# Patient Record
Sex: MALE | Race: WHITE
[De-identification: names, ages, dates, MRNs, and addresses within clinical notes are randomized per-mention and may not be internally consistent; named-entity substitution may affect disease eponyms.]

---

## 2017-07-15 ENCOUNTER — HOSPITAL ENCOUNTER (INPATIENT)
Dept: HOSPITAL 10 - FTE | Age: 67
LOS: 3 days | Discharge: HOME | DRG: 247 | End: 2017-07-18
Payer: MEDICAID

## 2017-07-15 VITALS
HEIGHT: 67 IN | HEIGHT: 67 IN | BODY MASS INDEX: 30.45 KG/M2 | WEIGHT: 194.01 LBS | BODY MASS INDEX: 30.45 KG/M2 | WEIGHT: 194.01 LBS

## 2017-07-15 DIAGNOSIS — Z79.02: ICD-10-CM

## 2017-07-15 DIAGNOSIS — I25.5: ICD-10-CM

## 2017-07-15 DIAGNOSIS — I71.2: ICD-10-CM

## 2017-07-15 DIAGNOSIS — I10: ICD-10-CM

## 2017-07-15 DIAGNOSIS — I21.4: Primary | ICD-10-CM

## 2017-07-15 DIAGNOSIS — Z79.82: ICD-10-CM

## 2017-07-15 DIAGNOSIS — N17.9: ICD-10-CM

## 2017-07-15 DIAGNOSIS — R07.9: ICD-10-CM

## 2017-07-15 DIAGNOSIS — I25.10: ICD-10-CM

## 2017-07-15 DIAGNOSIS — Z79.4: ICD-10-CM

## 2017-07-15 DIAGNOSIS — E11.9: ICD-10-CM

## 2017-07-15 LAB
ADD SCAN DIFF: NO
ALBUMIN SERPL-MCNC: 4.9 G/DL (ref 3.3–4.9)
ALBUMIN/GLOB SERPL: 1.75 {RATIO}
ALP SERPL-CCNC: 70 IU/L (ref 42–121)
ALT SERPL-CCNC: 45 IU/L (ref 13–69)
ANION GAP SERPL CALC-SCNC: 19 MMOL/L (ref 8–16)
APTT BLD: 26.6 SEC (ref 25–35)
AST SERPL-CCNC: 36 IU/L (ref 15–46)
BASOPHILS # BLD AUTO: 0 10^3/UL (ref 0–0.1)
BASOPHILS NFR BLD: 0.5 % (ref 0–2)
BILIRUB DIRECT SERPL-MCNC: 0 MG/DL (ref 0–0.2)
BILIRUB SERPL-MCNC: 0.1 MG/DL (ref 0.2–1.3)
BNP SERPL-MCNC: 1640 PG/ML (ref 0–125)
BUN SERPL-MCNC: 28 MG/DL (ref 7–20)
CALCIUM SERPL-MCNC: 9.7 MG/DL (ref 8.4–10.2)
CHLORIDE SERPL-SCNC: 100 MMOL/L (ref 97–110)
CK MB SERPL-MCNC: 6.18 NG/ML (ref 0–2.4)
CK SERPL-CCNC: 129 IU/L (ref 23–200)
CO2 SERPL-SCNC: 30 MMOL/L (ref 21–31)
CREAT SERPL-MCNC: 1.17 MG/DL (ref 0.61–1.24)
EOSINOPHIL # BLD: 0.3 10^3/UL (ref 0–0.5)
EOSINOPHIL NFR BLD: 4 % (ref 0–7)
ERYTHROCYTE [DISTWIDTH] IN BLOOD BY AUTOMATED COUNT: 11.9 % (ref 11.5–14.5)
GLOBULIN SER-MCNC: 2.8 G/DL (ref 1.3–3.2)
GLUCOSE SERPL-MCNC: 131 MG/DL (ref 70–220)
HCT VFR BLD CALC: 43.9 % (ref 42–52)
HGB BLD-MCNC: 14.6 G/DL (ref 14–18)
INR PPP: 0.89
LYMPHOCYTES # BLD AUTO: 2.7 10^3/UL (ref 0.8–2.9)
LYMPHOCYTES NFR BLD AUTO: 34.3 % (ref 15–51)
MCH RBC QN AUTO: 30.3 PG (ref 29–33)
MCHC RBC AUTO-ENTMCNC: 33.3 G/DL (ref 32–37)
MCV RBC AUTO: 91.1 FL (ref 82–101)
MONOCYTES # BLD: 0.5 10^3/UL (ref 0.3–0.9)
MONOCYTES NFR BLD: 6.8 % (ref 0–11)
NEUTROPHILS # BLD: 4.3 10^3/UL (ref 1.6–7.5)
NEUTROPHILS NFR BLD AUTO: 54 % (ref 39–77)
NRBC # BLD MANUAL: 0 10^3/UL (ref 0–0)
NRBC BLD QL: 0 /100WBC (ref 0–0)
PLATELET # BLD: 186 10^3/UL (ref 140–415)
PMV BLD AUTO: 12.3 FL (ref 7.4–10.4)
POTASSIUM SERPL-SCNC: 4.4 MMOL/L (ref 3.5–5.1)
PROT SERPL-MCNC: 7.7 G/DL (ref 6.1–8.1)
PROTHROMBIN TIME: 12 SEC (ref 12.2–14.2)
PT RATIO: 0.9
RBC # BLD AUTO: 4.82 10^6/UL (ref 4.7–6.1)
SODIUM SERPL-SCNC: 145 MMOL/L (ref 135–144)
TROPONIN I SERPL-MCNC: 1.16 NG/ML (ref 0–0.12)
WBC # BLD AUTO: 8 10^3/UL (ref 4.8–10.8)

## 2017-07-15 PROCEDURE — 85730 THROMBOPLASTIN TIME PARTIAL: CPT

## 2017-07-15 PROCEDURE — 92928 PRQ TCAT PLMT NTRAC ST 1 LES: CPT

## 2017-07-15 PROCEDURE — 83880 ASSAY OF NATRIURETIC PEPTIDE: CPT

## 2017-07-15 PROCEDURE — C1887 CATHETER, GUIDING: HCPCS

## 2017-07-15 PROCEDURE — 84484 ASSAY OF TROPONIN QUANT: CPT

## 2017-07-15 PROCEDURE — 80053 COMPREHEN METABOLIC PANEL: CPT

## 2017-07-15 PROCEDURE — 84100 ASSAY OF PHOSPHORUS: CPT

## 2017-07-15 PROCEDURE — 84443 ASSAY THYROID STIM HORMONE: CPT

## 2017-07-15 PROCEDURE — 83036 HEMOGLOBIN GLYCOSYLATED A1C: CPT

## 2017-07-15 PROCEDURE — 82962 GLUCOSE BLOOD TEST: CPT

## 2017-07-15 PROCEDURE — 80048 BASIC METABOLIC PNL TOTAL CA: CPT

## 2017-07-15 PROCEDURE — C1725 CATH, TRANSLUMIN NON-LASER: HCPCS

## 2017-07-15 PROCEDURE — 82553 CREATINE MB FRACTION: CPT

## 2017-07-15 PROCEDURE — 71275 CT ANGIOGRAPHY CHEST: CPT

## 2017-07-15 PROCEDURE — 83735 ASSAY OF MAGNESIUM: CPT

## 2017-07-15 PROCEDURE — 71010: CPT

## 2017-07-15 PROCEDURE — 85025 COMPLETE CBC W/AUTO DIFF WBC: CPT

## 2017-07-15 PROCEDURE — 85610 PROTHROMBIN TIME: CPT

## 2017-07-15 PROCEDURE — C9113 INJ PANTOPRAZOLE SODIUM, VIA: HCPCS

## 2017-07-15 PROCEDURE — 82550 ASSAY OF CK (CPK): CPT

## 2017-07-15 PROCEDURE — 93458 L HRT ARTERY/VENTRICLE ANGIO: CPT

## 2017-07-15 PROCEDURE — 80061 LIPID PANEL: CPT

## 2017-07-15 PROCEDURE — 93306 TTE W/DOPPLER COMPLETE: CPT

## 2017-07-15 PROCEDURE — C1876 STENT, NON-COA/NON-COV W/DEL: HCPCS

## 2017-07-15 PROCEDURE — 93005 ELECTROCARDIOGRAM TRACING: CPT

## 2017-07-15 PROCEDURE — C1769 GUIDE WIRE: HCPCS

## 2017-07-15 NOTE — ERA
ER Documentation


Chief Complaint


Date/Time


DATE: 7/15/17 


TIME: 17:28


Chief Complaint


LEFT ARM PAIN RADIATES TO NECK AND CHEST WALL X1WEEK





HPI


This is a very pleasant 67-year-old male, [place]er-speaking with a history of 

diabetes and hypertension that presents to the emergency department complaining 

of 2 weeks of intermittent chest pain.  The patient indicates that the pain 

initially began in his left arm was a pressure-like sensation.  The pain began 

to spread to his neck and chest wall.  He states the pain is localized to the 

left chest with no radiation to the right side.  He also indicates that he has 

been experiencing back pain on the left-hand side.  He denies any fever shaking 

or chills.  He has no shortness of breath at rest or exertion.  He denies any 

recent remote trauma.  He states he has never had any similar symptoms in the 

past.  He denies any recent exertion or heavy lifting.  He is right-handed 

dominant.  He did indicate that just prior to arrival he attempted to lift a 

heavy object and this significantly exacerbated the pain to 10 out of 10 in 

intensity.  He denies any weakness of the upper or lower extremities.  He 

denies any lower back pain or abdominal pain.  He has not experienced any 

hemoptysis hematemesis or melanotic stools





ROS


All systems reviewed and are negative except as per history of present illness.





Allergies


Allergies:  


Coded Allergies:  


     No Known Allergy (Unverified , 7/15/17)





PMhx/Soc


History of Surgery:  No


Anesthesia Reaction:  No


Hx Neurological Disorder:  No


Hx Respiratory Disorders:  No


Hx Cardiac Disorders:  Yes (HTN)


Hx Psychiatric Problems:  No


Hx Miscellaneous Medical Probl:  Yes (diabetes)


Hx Alcohol Use:  Yes


Hx Substance Use:  No


Hx Tobacco Use:  No


Smoking Status:  Never smoker





Physical Exam


Vitals





Vital Signs








  Date Time  Temp Pulse Resp B/P Pulse Ox O2 Delivery O2 Flow Rate FiO2


 


7/15/17 16:28 97.9 80 18 141/82 98   








Physical Exam


Constitutional:Well-developed. Well-nourished.


HEENT:Normocephalic. Atraumatic.Pupils were equal round reactive to light. 

Moist mucous membranes.No tonsillar exudates.


Neck: No nuchal rigidity. No lymphadenopathy. No posterior cervical spine 

tenderness or step-offs.


Respiratory: Not using accessory muscles of respiration.Lungs were clear to 

auscultation bilaterally. No rhonchi. No rales. No wheezing. 


Cardiovascular: Regular rate regular rhythm.No murmurs. No rubs were 

appreciated.S1, S2 normal. Distal pulses are palpable 2+ bilaterally.


GI: Abdomen was soft. Nontender. Non Distended. No pulsatile abdominal masses 

or bruits. No rebound. No guarding. Bowel sounds were present and normal. 


Muscle skeletal: Full range of motion of both the upper and lower extremities 

bilaterally.Normal muscle tone.No assymetrical calf tenderness or swelling.  

Reproducible tenderness over the left forearm with compartment soft.


Skin: No petechia, no purpura. No lesions on the palms or the soles of the 

feet. No maculopapular rash.


NEURO: Patient was alert, awake, orientated x3.No facial droop. Gait observed 

and normal with no ataxia.Speech had regular rate and rhythm. No focal 

neurological deficits.


Result Diagram:  


7/15/17 1740                                                                   

             7/15/17 1740





Results 24 hrs








 Laboratory Tests








Test


  7/15/17


17:40


 


White Blood Count 8.010^3/ul 


 


Red Blood Count 4.8210^6/ul 


 


Hemoglobin 14.6g/dl 


 


Hematocrit 43.9% 


 


Mean Corpuscular Volume 91.1fl 


 


Mean Corpuscular Hemoglobin 30.3pg 


 


Mean Corpuscular Hemoglobin


Concent 33.3g/dl 


 


 


Red Cell Distribution Width 11.9% 


 


Platelet Count 42012^3/UL 


 


Mean Platelet Volume 12.3fl 


 


Neutrophils % 54.0% 


 


Lymphocytes % 34.3% 


 


Monocytes % 6.8% 


 


Eosinophils % 4.0% 


 


Basophils % 0.5% 


 


Nucleated Red Blood Cells % 0.0/100WBC 


 


Neutrophils # 4.310^3/ul 


 


Lymphocytes # 2.710^3/ul 


 


Monocytes # 0.510^3/ul 


 


Eosinophils # 0.310^3/ul 


 


Basophils # 0.010^3/ul 


 


Nucleated Red Blood Cells # 0.010^3/ul 


 


Prothrombin Time 12.0Sec 


 


Prothrombin Time Ratio 0.9 


 


INR International Normalized


Ratio 0.89 


 


 


Activated Partial


Thromboplast Time 26.6Sec 


 


 


Sodium Level 145mmol/L 


 


Potassium Level 4.4mmol/L 


 


Chloride Level 100mmol/L 


 


Carbon Dioxide Level 30mmol/L 


 


Anion Gap 19 


 


Blood Urea Nitrogen 28mg/dl 


 


Creatinine 1.17mg/dl 


 


Glucose Level 131mg/dl 


 


Calcium Level 9.7mg/dl 


 


Total Bilirubin 0.1mg/dl 


 


Direct Bilirubin 0.00mg/dl 


 


Indirect Bilirubin 0.1mg/dl 


 


Aspartate Amino Transf


(AST/SGOT) 36IU/L 


 


 


Alanine Aminotransferase


(ALT/SGPT) 45IU/L 


 


 


Alkaline Phosphatase 70IU/L 


 


Creatine Kinase 129IU/L 


 


Creatine Kinase Index 4.8 


 


Creatinine Kinase MB (Mass) 6.18ng/ml 


 


Troponin I 1.160ng/ml 


 


B-Type Natriuretic Peptide 1640PG/ML 


 


Total Protein 7.7g/dl 


 


Albumin 4.9g/dl 


 


Globulin 2.80g/dl 


 


Albumin/Globulin Ratio 1.75 








 Current Medications








 Medications


  (Trade)  Dose


 Ordered  Sig/Lyle


 Route


 PRN Reason  Start Time


 Stop Time Status Last Admin


Dose Admin


 


 Aspirin


  (Aspirin)  325 mg  ONCE  STAT


 PO


   7/15/17 18:34


 7/15/17 18:35 DC 7/15/17 18:57


 


 


 Nitroglycerin


  (Nitroglycerin


  (Sl Tab) 0.4 Mg)  1 tab  Q5M UP TO 3 DOSES PRN


 SL


 CHEST PAIN  7/15/17 19:00


     


 


 


 IV Flush 10 ml  10 ml  STK-MED ONCE


 .ROUTE


   7/15/17 19:06


 7/15/17 19:07 DC  


 


 


 Sodium Chloride


  (NS)  0 ml @ ud  STK-MED ONCE


 .ROUTE


   7/15/17 19:06


 7/15/17 19:07 DC  


 


 


 Iodixanol


  (Visipaque Locm)  100 ml  STK-MED ONCE


 .ROUTE


   7/15/17 19:06


 7/15/17 19:07 DC  


 


 


 Ondansetron HCl


  (Zofran Inj)  4 mg  ER BRIDGE PRN


 IV


 NAUSEA AND/OR VOMITING  7/15/17 20:00


 7/16/17 19:59   


 


 


 Acetaminophen


  (Tylenol Tab)  650 mg  ER BRIDGE PRN


 PO


 MILD PAIN/FEVER  7/15/17 20:00


 7/16/17 19:59   


 


 


 IV Flush 10 ml  10 ml  STK-MED ONCE


 .ROUTE


   7/15/17 21:06


 7/15/17 21:07 DC  


 


 


 Sodium Chloride


  (NS)  100 ml @ ud  STK-MED ONCE


 .ROUTE


   7/15/17 21:06


 7/15/17 21:07 DC  


 


 


 Iodixanol


  (Visipaque Locm)  100 ml  STK-MED ONCE


 .ROUTE


   7/15/17 21:06


 7/15/17 21:07 DC  


 


 


 IV Flush 10 ml  10 ml  STK-MED ONCE


 .ROUTE


   7/15/17 21:28


 7/15/17 21:29 DC  


 


 


 Sodium Chloride


  (NS)  100 ml @ ud  STK-MED ONCE


 .ROUTE


   7/15/17 21:28


 7/15/17 21:29 DC  


 


 


 Iodixanol


  (Visipaque Locm)  100 ml  STK-MED ONCE


 .ROUTE


   7/15/17 21:28


 7/15/17 21:29 DC  


 














Procedures/MDM


The patient presented to the emergency department with chest pain. My clinical 

evaluation and workup was to distinguish minor causes of chest pain from acute 

life threatening conditions such as myocardial infarction, pulmonary embolism, 

aortic dissection, esophageal rupture, cardiac tamponade.





The patient was placed on a cardiac monitor and continuous pulse oximetry.  IV 

access established by nursing staff.





12 Lead EKG tracing ordered and reviewed by myself showed: 


Normal sinus rhythm of 75 bpm and no arrhythmia.


KY interval normal.


QRS duration normal.


No ST segment elevation.  Minimal criteria for LVH


No ST segment depression. No changes consistent with acute ischemia. 





The patient's troponin was elevated.  He was treated for non-STEMI.  The 

patient had received aspirin and nitroglycerin with improvement of his chest 

discomfort.  He had no other electrolyte abnormalities.





I repeated the EKG at 1938.  12 Lead EKG tracing ordered and reviewed by myself 

showed: 


Normal sinus rhythm of 73 bpm and no arrhythmia.


KY interval normal.


QRS duration normal.


No ST segment elevation


No ST segment depression. No changes consistent with acute ischemia. 





Given that the patient's chest pain radiates to the back I obtained a CT scan 

with IV contrast.  This was read by the radiologist as well as myself and 

indicated the following: The patient had no evidence of pulmonary embolism or 

aortic dissection.  The patient had an aneurysmal dilatation of the ascending 

aorta measuring 3.9.  The descending aorta was of normal caliber.





The patient will be admitted in serious condition with an anticipated stay of 

greater than 2 midnights under the care of the hospitalist Dr. Santos.  I will 

place a consult to the vascular surgeon Dr. Valdez for further evaluation 

into the patient's chest pain radiating to the back however at this time the 

patient was stable and did not require surgical intervention for the ascending 

aortic aneurysm.





Departure


Diagnosis:  


 Primary Impression:  


 NSTEMI (non-ST elevated myocardial infarction)


 Additional Impression:  


 Mild ascending aorta dilatation


Condition:  Serious











ROSE NELSON Jul 15, 2017 17:30

## 2017-07-15 NOTE — RADRPT
PROCEDURE:   CT Chest with IV contrast. 

 

CLINICAL INDICATION:   Chest pain. 

 

TECHNIQUE:   CT scan of the chest was performed on a multidetector scanner.  The patient was scanned
 following the uncomplicated intravenous administration of 100 cc of Visapaque 320 contrast.  3D, co
liliana and sagittal reformatted images were obtained from the axial source images. Images were review
ed on a high-resolution PACS workstation. The total exam CTDlvol = 355 mGy and DLP = 809 mGy-cm. One
 of the following 3 dose reduction techniques were used: Automated exposure control; adjustment of t
he mA and/or kV according to patient size; or use of iterative reconstruction technique.

 

COMPARISON:   07/15/2017 

 

FINDINGS:

 

Per CT technician report, there was extravasation of the patient's intravenous contrast injection.  
Study is nondiagnostic as there is no significant intravascular contrast bolus within the pulmonary 
arteries or aorta.

 

There is aneurysmal dilatation of the ascending aorta measuring 3.9 cm AP diameter.  Descending aort
a is normal in caliber..  There is no mediastinal or hilar lymphadenopathy or mass.  Heart is normal
 size.  There is trace fluid within the pericardial recesses.

 

There is mild dependent atelectasis.  No focal infiltrate.  There is no pleural effusion.  There is 
no pneumothorax.

 

There are no fractures.  There are degenerative changes of the thoracic spine.

 

Imaging obtained through the upper abdomen reveals no acute abnormality.   

 

IMPRESSION:

1.  Nondiagnostic examination for pulmonary emboli or aortic dissection.  No significant intravascul
ar contrast due to contrast extravasation at the injection site per CT technician.

2.  Aneurysmal dilatation of the ascending aorta.

3.  Trace fluid in the pericardial recess.

4.  Mild dependent atelectasis.  No focal infiltrate.

5.  Degenerative changes of the thoracic spine.

 

 

RPTAT:  HMVK

_____________________________________________ 

.Skinny Arreaga MD, MD           Date    Time 

Electronically viewed and signed by .Skinny Arreaga MD, MD on 07/15/2017 21:41 

 

D:  07/15/2017 21:41  T:  07/15/2017 21:41

.K/

## 2017-07-15 NOTE — RADRPT
PROCEDURE:   XR Chest. 

 

CLINICAL INDICATION:   Chest pain.

 

TECHNIQUE:   Single frontal view. 

 

COMPARISON:   None. 

 

FINDINGS:

The lungs are clear. 

The heart size is normal. 

There is no pleural effusion. 

There is no pneumothorax.   

 

IMPRESSION:

1.  Normal chest radiograph.

 

RPTAT: QQ

_____________________________________________ 

.Matheus Wills MD, MD           Date    Time 

Electronically viewed and signed by .Matheus Wills MD, MD on 07/15/2017 18:24 

 

D:  07/15/2017 18:24  T:  07/15/2017 18:24

.R/

## 2017-07-16 VITALS — DIASTOLIC BLOOD PRESSURE: 71 MMHG | RESPIRATION RATE: 20 BRPM | SYSTOLIC BLOOD PRESSURE: 107 MMHG

## 2017-07-16 VITALS — RESPIRATION RATE: 20 BRPM | DIASTOLIC BLOOD PRESSURE: 72 MMHG | SYSTOLIC BLOOD PRESSURE: 133 MMHG | HEART RATE: 65 BPM

## 2017-07-16 VITALS — DIASTOLIC BLOOD PRESSURE: 55 MMHG | RESPIRATION RATE: 18 BRPM | SYSTOLIC BLOOD PRESSURE: 122 MMHG

## 2017-07-16 VITALS — HEART RATE: 68 BPM

## 2017-07-16 VITALS — SYSTOLIC BLOOD PRESSURE: 133 MMHG | DIASTOLIC BLOOD PRESSURE: 73 MMHG | RESPIRATION RATE: 19 BRPM

## 2017-07-16 VITALS — HEART RATE: 58 BPM

## 2017-07-16 VITALS — HEART RATE: 60 BPM

## 2017-07-16 VITALS — SYSTOLIC BLOOD PRESSURE: 107 MMHG | RESPIRATION RATE: 18 BRPM | DIASTOLIC BLOOD PRESSURE: 66 MMHG

## 2017-07-16 VITALS — SYSTOLIC BLOOD PRESSURE: 111 MMHG | DIASTOLIC BLOOD PRESSURE: 69 MMHG | RESPIRATION RATE: 18 BRPM

## 2017-07-16 VITALS — HEART RATE: 59 BPM

## 2017-07-16 VITALS — HEART RATE: 71 BPM

## 2017-07-16 VITALS — HEART RATE: 64 BPM

## 2017-07-16 LAB
ADD SCAN DIFF: NO
ADD SCAN DIFF: NO
ALBUMIN SERPL-MCNC: 4.1 G/DL (ref 3.3–4.9)
ALBUMIN SERPL-MCNC: 4.3 G/DL (ref 3.3–4.9)
ALBUMIN/GLOB SERPL: 1.79 {RATIO}
ALBUMIN/GLOB SERPL: 1.86 {RATIO}
ALP SERPL-CCNC: 54 IU/L (ref 42–121)
ALP SERPL-CCNC: 57 IU/L (ref 42–121)
ALT SERPL-CCNC: 41 IU/L (ref 13–69)
ALT SERPL-CCNC: 45 IU/L (ref 13–69)
ANION GAP SERPL CALC-SCNC: 18 MMOL/L (ref 8–16)
ANION GAP SERPL CALC-SCNC: 19 MMOL/L (ref 8–16)
APTT BLD: 27.8 SEC (ref 25–35)
AST SERPL-CCNC: 38 IU/L (ref 15–46)
AST SERPL-CCNC: 39 IU/L (ref 15–46)
BASOPHILS # BLD AUTO: 0 10^3/UL (ref 0–0.1)
BASOPHILS # BLD AUTO: 0.1 10^3/UL (ref 0–0.1)
BASOPHILS NFR BLD: 0.6 % (ref 0–2)
BASOPHILS NFR BLD: 0.8 % (ref 0–2)
BILIRUB DIRECT SERPL-MCNC: 0 MG/DL (ref 0–0.2)
BILIRUB DIRECT SERPL-MCNC: 0 MG/DL (ref 0–0.2)
BILIRUB SERPL-MCNC: 0.3 MG/DL (ref 0.2–1.3)
BILIRUB SERPL-MCNC: 0.3 MG/DL (ref 0.2–1.3)
BUN SERPL-MCNC: 24 MG/DL (ref 7–20)
BUN SERPL-MCNC: 25 MG/DL (ref 7–20)
CALCIUM SERPL-MCNC: 9.2 MG/DL (ref 8.4–10.2)
CALCIUM SERPL-MCNC: 9.7 MG/DL (ref 8.4–10.2)
CHLORIDE SERPL-SCNC: 97 MMOL/L (ref 97–110)
CHLORIDE SERPL-SCNC: 98 MMOL/L (ref 97–110)
CHOLEST SERPL-MCNC: 146 MG/DL (ref 100–200)
CHOLEST/HDLC SERPL: 4.1 RATIO
CK MB SERPL-MCNC: 10.7 NG/ML (ref 0–2.4)
CK MB SERPL-MCNC: 8.73 NG/ML (ref 0–2.4)
CK MB SERPL-MCNC: 8.92 NG/ML (ref 0–2.4)
CK SERPL-CCNC: 151 IU/L (ref 23–200)
CK SERPL-CCNC: 152 IU/L (ref 23–200)
CK SERPL-CCNC: 162 IU/L (ref 23–200)
CO2 SERPL-SCNC: 29 MMOL/L (ref 21–31)
CO2 SERPL-SCNC: 31 MMOL/L (ref 21–31)
CREAT SERPL-MCNC: 0.91 MG/DL (ref 0.61–1.24)
CREAT SERPL-MCNC: 1.05 MG/DL (ref 0.61–1.24)
EOSINOPHIL # BLD: 0.2 10^3/UL (ref 0–0.5)
EOSINOPHIL # BLD: 0.2 10^3/UL (ref 0–0.5)
EOSINOPHIL NFR BLD: 2.7 % (ref 0–7)
EOSINOPHIL NFR BLD: 3.4 % (ref 0–7)
ERYTHROCYTE [DISTWIDTH] IN BLOOD BY AUTOMATED COUNT: 11.8 % (ref 11.5–14.5)
ERYTHROCYTE [DISTWIDTH] IN BLOOD BY AUTOMATED COUNT: 11.9 % (ref 11.5–14.5)
GLOBULIN SER-MCNC: 2.2 G/DL (ref 1.3–3.2)
GLOBULIN SER-MCNC: 2.4 G/DL (ref 1.3–3.2)
GLUCOSE SERPL-MCNC: 172 MG/DL (ref 70–220)
GLUCOSE SERPL-MCNC: 240 MG/DL (ref 70–220)
HCT VFR BLD CALC: 40.9 % (ref 42–52)
HCT VFR BLD CALC: 40.9 % (ref 42–52)
HDLC SERPL-MCNC: 35 MG/DL (ref 30–78)
HGB BLD-MCNC: 13.6 G/DL (ref 14–18)
HGB BLD-MCNC: 13.8 G/DL (ref 14–18)
INR PPP: 1.06
LYMPHOCYTES # BLD AUTO: 1.8 10^3/UL (ref 0.8–2.9)
LYMPHOCYTES # BLD AUTO: 2 10^3/UL (ref 0.8–2.9)
LYMPHOCYTES NFR BLD AUTO: 28.7 % (ref 15–51)
LYMPHOCYTES NFR BLD AUTO: 30.5 % (ref 15–51)
MCH RBC QN AUTO: 30.6 PG (ref 29–33)
MCH RBC QN AUTO: 31.1 PG (ref 29–33)
MCHC RBC AUTO-ENTMCNC: 33.3 G/DL (ref 32–37)
MCHC RBC AUTO-ENTMCNC: 33.7 G/DL (ref 32–37)
MCV RBC AUTO: 92.1 FL (ref 82–101)
MCV RBC AUTO: 92.1 FL (ref 82–101)
MONOCYTES # BLD: 0.4 10^3/UL (ref 0.3–0.9)
MONOCYTES # BLD: 0.4 10^3/UL (ref 0.3–0.9)
MONOCYTES NFR BLD: 6.1 % (ref 0–11)
MONOCYTES NFR BLD: 6.8 % (ref 0–11)
NEUTROPHILS # BLD: 3.8 10^3/UL (ref 1.6–7.5)
NEUTROPHILS # BLD: 3.9 10^3/UL (ref 1.6–7.5)
NEUTROPHILS NFR BLD AUTO: 58.9 % (ref 39–77)
NEUTROPHILS NFR BLD AUTO: 60.5 % (ref 39–77)
NRBC # BLD MANUAL: 0 10^3/UL (ref 0–0)
NRBC # BLD MANUAL: 0 10^3/UL (ref 0–0)
NRBC BLD QL: 0 /100WBC (ref 0–0)
NRBC BLD QL: 0 /100WBC (ref 0–0)
PLATELET # BLD: 170 10^3/UL (ref 140–415)
PLATELET # BLD: 172 10^3/UL (ref 140–415)
PMV BLD AUTO: 12.4 FL (ref 7.4–10.4)
PMV BLD AUTO: 12.4 FL (ref 7.4–10.4)
POTASSIUM SERPL-SCNC: 4.1 MMOL/L (ref 3.5–5.1)
POTASSIUM SERPL-SCNC: 4.2 MMOL/L (ref 3.5–5.1)
PROT SERPL-MCNC: 6.3 G/DL (ref 6.1–8.1)
PROT SERPL-MCNC: 6.7 G/DL (ref 6.1–8.1)
PROTHROMBIN TIME: 13.8 SEC (ref 12.2–14.2)
PT RATIO: 1.1
RBC # BLD AUTO: 4.44 10^6/UL (ref 4.7–6.1)
RBC # BLD AUTO: 4.44 10^6/UL (ref 4.7–6.1)
SODIUM SERPL-SCNC: 141 MMOL/L (ref 135–144)
SODIUM SERPL-SCNC: 143 MMOL/L (ref 135–144)
TRIGL SERPL-MCNC: 124 MG/DL (ref 0–149)
TROPONIN I SERPL-MCNC: 1.99 NG/ML (ref 0–0.12)
TROPONIN I SERPL-MCNC: 2.04 NG/ML (ref 0–0.12)
TROPONIN I SERPL-MCNC: 2.07 NG/ML (ref 0–0.12)
WBC # BLD AUTO: 6.3 10^3/UL (ref 4.8–10.8)
WBC # BLD AUTO: 6.6 10^3/UL (ref 4.8–10.8)

## 2017-07-16 RX ADMIN — HEPARIN SODIUM AND DEXTROSE SCH MLS/HR: 10000; 5 INJECTION INTRAVENOUS at 11:56

## 2017-07-16 RX ADMIN — ASPIRIN 81 MG SCH MG: 81 TABLET ORAL at 08:23

## 2017-07-16 RX ADMIN — METOPROLOL TARTRATE SCH MG: 25 TABLET, FILM COATED ORAL at 20:51

## 2017-07-16 RX ADMIN — METOPROLOL TARTRATE SCH MG: 25 TABLET, FILM COATED ORAL at 10:40

## 2017-07-16 RX ADMIN — PANTOPRAZOLE SODIUM SCH MG: 40 INJECTION, POWDER, FOR SOLUTION INTRAVENOUS at 07:24

## 2017-07-16 RX ADMIN — HEPARIN SODIUM AND DEXTROSE SCH MLS/HR: 10000; 5 INJECTION INTRAVENOUS at 19:12

## 2017-07-16 RX ADMIN — INSULIN GLARGINE SCH UNIT: 100 INJECTION, SOLUTION SUBCUTANEOUS at 12:01

## 2017-07-16 RX ADMIN — ATORVASTATIN CALCIUM SCH MG: 40 TABLET, FILM COATED ORAL at 20:52

## 2017-07-16 NOTE — RADRPT
PROCEDURE:   CTA Chest. 

 

CLINICAL INDICATION:   Chest pain 

 

TECHNIQUE:  Cough the injury and was attempted however of the line that had been placed was not sero
ma for injection and the study was unable to be performed.

 

One or more of the following dose reduction techniques were used:

- Automated exposure control.

- Adjustment of the mA and/or kV according to patient size.

Use of iterative reconstruction technique.

 

.6 mGycm

CTDIvol June 11 0.3 mGy

 

COMPARISON:   No prior studies are available for comparison. 

 

FINDINGS:

Nondiagnostic study

 

 

IMPRESSION:

Nondiagnostic study

 

RPTAT: QQ

_____________________________________________ 

.Patricia Delvalle MD, MD           Date    Time 

Electronically viewed and signed by .Patricia Delvalle MD, MD on 07/16/2017 09:17 

 

D:  07/16/2017 09:17  T:  07/16/2017 09:17

.IGNACIA/

## 2017-07-16 NOTE — RADRPT
PROCEDURE:   CTA Chest. 

 

CLINICAL INDICATION:   Chest pain 

 

TECHNIQUE:   The study was performed utilizing a multidetector CT scanner. Direct spiral 1 mm axial 
sections were obtained from the thoracic inlet to the upper abdomen with the use of 100 cc of Omnipa
que 350 nonionic intravenous contrast material and reformatted at 3 mm. Coronal and sagittal reforma
tions were obtained. 3-D reconstructions were also obtained.  The images were reviewed on a PACS wor
kstation. 

 

One or more of the following dose reduction techniques were used:

- Automated exposure control.

- Adjustment of the mA and/or kV according to patient size.

Use of iterative reconstruction technique.

 

.3 mGycm

CTDIvol 22.5 and 16.9 mGy

 

COMPARISON:   Chest CT 07/05/2017 

 

FINDINGS:

The pulmonary arteries are within normal limits with no filling defects present to suggest pulmonary
 embolus.  Aortic and coronary artery atherosclerotic plaque and calcification are present with no e
vidence of dissection.  There is no cardiomegaly. There is ascending aortic ectasia up to 3.7 cm.

 

There is no lung consolidation, pleural effusion, or pneumothorax.  There is no suspicious nodule or
 mass.  The airways are patent. There are no enlarged mediastinal or axillary lymph nodes.

 

Upper abdominal structures are within normal limits.  Degenerative changes are seen in the lumbar sp
ine with no evidence of acute osseous abnormality.

 

 

IMPRESSION:

No CT evidence for pulmonary embolus. There is no aortic dissection. There is ascending aortic ectas
ia.  

 

Atherosclerotic disease.

 

No acute pulmonary process.

 

 

 

RPTAT: AA

_____________________________________________ 

.Patricia Delvalle MD, MD           Date    Time 

Electronically viewed and signed by .Patricia Delvalle MD, MD on 07/16/2017 10:43 

 

D:  07/16/2017 10:43  T:  07/16/2017 10:43

.J/

## 2017-07-16 NOTE — CONS
Date/Time of Note


Date/Time of Note


DATE: 7/16/17 


TIME: 15:02





Assessment/Plan


Assessment/Plan


Chief Complaint/Hosp Course


Aortic aneurysm


Problems:  


Additional Assessment/Plan


This is a 67-year-old male with a history of hypertension and diabetes.  

Patient was admitted with chest pain was diagnosed with non-ST elevation MI 

part of his workup which which included a CAT scan of the chest revealed an 

ascending aortic aneurysm 3.9 cm the first CAT scan was inconclusive for 

dissection the second CAT scan has showed there is no dissection patient does 

not have any typical pain that is for aortic dissection and is hemodynamically 

stable


We will monitor the aortic aneurysm.  Repeat CAT scan in 6 months





Consultation Date/Type/Reason


Admit Date/Time


Jul 15, 2017 at 19:38


Date of Consultation:  Jul 16, 2017


Reason for Consultation


Aortic aneurysm





Hx of Present Illness


This is a 67-year-old male with a history of hypertension and diabetes.  

Patient was admitted with chest pain was diagnosed with non-ST elevation MI 

part of his workup which which included a CAT scan of the chest revealed an 

ascending aortic aneurysm 3.9 cm the first CAT scan was inconclusive for 

dissection the second CAT scan has showed there is no dissection patient does 

not have any typical pain that is for aortic dissection and is hemodynamically 

stable


Cardiovascular:  no complaints


Gastrointestinal:  no complaints


Genitourinary:  no complaints


Musculoskeletal:  no complaints


Skin:  no complaints


Psychological:  nl mood/affect, no complaints





Past Medical History


Medical History:  coronary artery disease, diabetes





Past Surgical History


Past Surgical Hx:  no surgical history





Social History


Alcohol Use:  none (Patient states that 3 weeks ago he had his last drink, 

however he does state that he drinks approximately a 6 pack a day.)


Smoking Status:  Never smoker


Drug Use:  none





Exam/Review of Systems


Vital Signs


Vitals





 Vital Signs








  Date Time  Temp Pulse Resp B/P Pulse Ox O2 Delivery O2 Flow Rate FiO2


 


7/16/17 12:12  59      


 


7/16/17 11:59 98.6  18 107/66 99   


 


7/16/17 02:50      Room Air  














 Intake and Output   


 


 7/15/17 7/15/17 7/16/17





 15:00 23:00 07:00


 


Intake Total   500 ml


 


Output Total   600 ml


 


Balance   -100 ml











Exam


ENMT:  nl external ears & nose, nl lips & teeth, nl nasal mucosa & septum


Neck:  non-tender, supple


Respiratory:  clear to auscultation, normal air movement


Cardiovascular:  nl pulses, regular rate and rhythm


Gastrointestinal:  nl liver, spleen, non-tender, soft





Results


Result Diagram:  


7/16/17 1134                                                                   

             7/16/17 0731





Results 24 hrs





Laboratory Tests








Test


  7/15/17


17:40 7/16/17


05:03 7/16/17


05:07 7/16/17


07:27


 


White Blood Count 8.0   6.6    


 


Red Blood Count 4.82   4.44  L  


 


Hemoglobin 14.6   13.8  L  


 


Hematocrit 43.9   40.9  L  


 


Mean Corpuscular Volume 91.1   92.1    


 


Mean Corpuscular Hemoglobin 30.3   31.1    


 


Mean Corpuscular Hemoglobin


Concent 33.3  


  33.7  


  


  


 


 


Red Cell Distribution Width 11.9   11.8    


 


Platelet Count 186   170    


 


Mean Platelet Volume 12.3  H 12.4  H  


 


Neutrophils % 54.0   58.9    


 


Lymphocytes % 34.3   30.5    


 


Monocytes % 6.8   6.1    


 


Eosinophils % 4.0   3.4    


 


Basophils % 0.5   0.6    


 


Nucleated Red Blood Cells % 0.0   0.0    


 


Neutrophils # 4.3   3.9    


 


Lymphocytes # 2.7   2.0    


 


Monocytes # 0.5   0.4    


 


Eosinophils # 0.3   0.2    


 


Basophils # 0.0   0.0    


 


Nucleated Red Blood Cells # 0.0   0.0    


 


Prothrombin Time 12.0  L   


 


Prothrombin Time Ratio 0.9     


 


INR International Normalized


Ratio 0.89  


  


  


  


 


 


Activated Partial


Thromboplast Time 26.6  


  


  


  


 


 


Sodium Level 145  H 143    


 


Potassium Level 4.4   4.1    


 


Chloride Level 100   98    


 


Carbon Dioxide Level 30   31    


 


Anion Gap 19  H 18  H  


 


Blood Urea Nitrogen 28  H 24  H  


 


Creatinine 1.17   1.05    


 


Glucose Level 131   240  #H  


 


Calcium Level 9.7   9.2    


 


Total Bilirubin 0.1  L 0.3    


 


Direct Bilirubin 0.00   0.00    


 


Indirect Bilirubin 0.1   0.3    


 


Aspartate Amino Transf


(AST/SGOT) 36  


  39  


  


  


 


 


Alanine Aminotransferase


(ALT/SGPT) 45  


  45  


  


  


 


 


Alkaline Phosphatase 70   54    


 


Creatine Kinase 129   152    162  


 


Creatine Kinase Index 4.8   7.0    5.5  


 


Creatinine Kinase MB (Mass) 6.18  H 10.70  H  8.92  H


 


Troponin I 1.160  *H 1.990  *H  2.070  *H


 


B-Type Natriuretic Peptide 1640  H   


 


Total Protein 7.7   6.7  #  


 


Albumin 4.9   4.3    


 


Globulin 2.80   2.40    


 


Albumin/Globulin Ratio 1.75   1.79    


 


Thyroid Stimulating Hormone


(TSH) 


  4.020  


  


  


 


 


Hemoglobin A1c   10.9  H 


 


Triglycerides Level   124   


 


Cholesterol Level   146   


 


LDL Cholesterol, Calculated   86   


 


HDL Cholesterol   35   


 


Cholesterol/HDL Ratio   4.1   














Test


  7/16/17


07:31 7/16/17


07:42 7/16/17


09:22 7/16/17


11:14


 


Sodium Level 141     


 


Potassium Level 4.2     


 


Chloride Level 97     


 


Carbon Dioxide Level 29     


 


Anion Gap 19  H   


 


Blood Urea Nitrogen 25  H   


 


Creatinine 0.91     


 


Glucose Level 172     


 


Calcium Level 9.7     


 


Total Bilirubin 0.3     


 


Direct Bilirubin 0.00     


 


Indirect Bilirubin 0.3     


 


Aspartate Amino Transf


(AST/SGOT) 38  


  


  


  


 


 


Alanine Aminotransferase


(ALT/SGPT) 41  


  


  


  


 


 


Alkaline Phosphatase 57     


 


Total Protein 6.3     


 


Albumin 4.1     


 


Globulin 2.20     


 


Albumin/Globulin Ratio 1.86     


 


Bedside Glucose  173    


 


Creatine Kinase   151   


 


Creatine Kinase Index   5.8   


 


Creatinine Kinase MB (Mass)   8.73  H 


 


Troponin I   2.040  *H 


 


Prothrombin Time    13.8  


 


Prothrombin Time Ratio    1.1  


 


INR International Normalized


Ratio 


  


  


  1.06  


 


 


Activated Partial


Thromboplast Time 


  


  


  27.8  


 














Test


  7/16/17


11:26 7/16/17


11:34 


  


 


 


Bedside Glucose 182     


 


White Blood Count  6.3    


 


Red Blood Count  4.44  L  


 


Hemoglobin  13.6  L  


 


Hematocrit  40.9  L  


 


Mean Corpuscular Volume  92.1    


 


Mean Corpuscular Hemoglobin  30.6    


 


Mean Corpuscular Hemoglobin


Concent 


  33.3  


  


  


 


 


Red Cell Distribution Width  11.9    


 


Platelet Count  172    


 


Mean Platelet Volume  12.4  H  


 


Neutrophils %  60.5    


 


Lymphocytes %  28.7    


 


Monocytes %  6.8    


 


Eosinophils %  2.7    


 


Basophils %  0.8    


 


Nucleated Red Blood Cells %  0.0    


 


Neutrophils #  3.8    


 


Lymphocytes #  1.8    


 


Monocytes #  0.4    


 


Eosinophils #  0.2    


 


Basophils #  0.1    


 


Nucleated Red Blood Cells #  0.0    











Medications


Medications





 Current Medications


Ondansetron HCl (Zofran Inj) 4 mg Q6H  PRN IV NAUSEA AND/OR VOMITING;  Start 7/ 16/17 at 00:30


Aspirin (Aspirin) 81 mg DAILY PO  Last administered on 7/16/17at 08:23; Admin 

Dose 81 MG;  Start 7/16/17 at 09:00


Acetaminophen (Tylenol Tab) 650 mg Q6H  PRN PO PAIN LEVEL 1-3 OR FEVER;  Start 7 /16/17 at 00:30


Morphine Sulfate (morphine) 2 mg Q4H  PRN IV PAIN LEVEL 7-10;  Start 7/16/17 at 

00:30


Pantoprazole (Protonix Iv) 40 mg DAILY@06 IV  Last administered on 7/16/17at 07:

24; Admin Dose 40 MG;  Start 7/16/17 at 06:00


Diagnostic Test (Pha) (Accu-Chek) 1 ea 02 XX ;  Start 7/17/17 at 02:00


Miscellaneous Information 1 ea NOTE XX ;  Start 7/16/17 at 07:00


Glucose (Glutose) 15 gm Q15M  PRN PO DECREASED GLUCOSE;  Start 7/16/17 at 07:00


Glucose (Glutose) 22.5 gm Q15M  PRN PO DECREASED GLUCOSE;  Start 7/16/17 at 07:

00


Dextrose (D50w Syringe) 25 ml Q15M  PRN IV DECREASED GLUCOSE;  Start 7/16/17 at 

07:00


Dextrose (D50w Syringe) 50 ml Q15M  PRN IV DECREASED GLUCOSE;  Start 7/16/17 at 

07:00


Glucagon (Glucagen) 1 mg Q15M  PRN IM DECREASED GLUCOSE;  Start 7/16/17 at 07:00


Glucose (Glutose) 15 gm Q15M  PRN BUCCAL DECREASED GLUCOSE;  Start 7/16/17 at 07

:00


Atorvastatin Calcium (Lipitor) 40 mg HS PO ;  Start 7/16/17 at 21:00


Metoprolol Tartrate (Lopressor) 25 mg BID PO  Last administered on 7/16/17at 10:

40; Admin Dose 25 MG;  Start 7/16/17 at 09:30


Insulin Glargine (Lantus) 13 unit DAILY@08 SC  Last administered on 7/16/17at 12

:01; Admin Dose 13 UNIT;  Start 7/16/17 at 11:00











KENNA JONES MD Jul 16, 2017 15:05

## 2017-07-16 NOTE — CONS
Date/Time of Note


Date/Time of Note


DATE: 7/16/17 


TIME: 09:44





Assessment/Plan


Assessment/Plan


Chief Complaint/Hosp Course


NSTEMI: Trop up to 2. Now asymptomatic. Low suspicion for aortic dissection but 

as there was a high suspicion in the ER and the first test was nondiagnostic (

of note no contrast was given IV as was extravasated), will await results of 

this morning's CT to then start heparin drip


Ascending aorta aneurysm: 3.9 cm by CT


DM


HTN





-f/u CTA. If no dissection, heparin drip has been ordered and can be started


-plan for cardiac cath in am


-NPO after midnight


-ASA


-start lipitor 40mg


-start metoprolol 25mg BID


-bedrest


-echo


Problems:  





Consultation Date/Type/Reason


Admit Date/Time


Jul 15, 2017 at 19:38


Date of Consultation:  Jul 16, 2017


Type of Consultation:  Cardiology


Reason for Consultation


NSTEMI


Referring Provider:  SEGUN MAIER





Hx of Present Illness


66 yo M with a h/o DM, HTN, who presented with chest pain. The pt had been 

complaining of chest pain radiating to the back and so a CTA was ordered in the 

ER. The contrast extravasated and so no contrast was delivered and the test was 

read as nondiagnostic. This am it is reordered. The pt notes that over the past 

2 weeks he has been having exertional left arm/chest/jaw pressure which 

resolves with rest. Yesterday he had an episode lifting a small piece of wood 

and prior to bed had rest pain. Since coming in his chest pain resolved except 

for this am when he was having his blood drawn (afraid of needles). Currently 

asymptomatic. No SOB, no orthopnea, PND.


per hPI





Past Medical History


per HPI





Past Surgical History


Past Surgical Hx:  no surgical history





Social History


Alcohol Use:  heavy (Patient states that 3 weeks ago he had his last drink, 

however he does state that he drinks approximately a 6 pack a day.)


Smoking Status:  Never smoker


Drug Use:  none





Exam/Review of Systems


Vital Signs


Vitals





 Vital Signs








  Date Time  Temp Pulse Resp B/P Pulse Ox O2 Delivery O2 Flow Rate FiO2


 


7/16/17 08:12  68      


 


7/16/17 07:34 98.0  18 111/69 100   


 


7/16/17 02:50      Room Air  














 Intake and Output   


 


 7/15/17 7/15/17 7/16/17





 15:00 23:00 07:00


 


Intake Total   500 ml


 


Output Total   600 ml


 


Balance   -100 ml











Exam


Constitutional:  alert, oriented


Psych:  nl mood/affect, no complaints


Head:  atraumatic, normocephalic


Neck:  No jvd


Respiratory:  clear to auscultation, 


   No crackles/rales


Cardiovascular:  regular rate and rhythm, 


   No edema, No systolic murmur


Gastrointestinal:  non-tender, soft


Neurological:  nl mental status, nl speech





Results


EKG: sinus, no significant ST changes, poor RW progression, low voltage


Result Diagram:  


7/16/17 0503                                                                   

             7/16/17 0731





Results 24 hrs





Laboratory Tests








Test


  7/15/17


17:40 7/16/17


05:03 7/16/17


05:07 7/16/17


07:27


 


White Blood Count 8.0   6.6    


 


Red Blood Count 4.82   4.44  L  


 


Hemoglobin 14.6   13.8  L  


 


Hematocrit 43.9   40.9  L  


 


Mean Corpuscular Volume 91.1   92.1    


 


Mean Corpuscular Hemoglobin 30.3   31.1    


 


Mean Corpuscular Hemoglobin


Concent 33.3  


  33.7  


  


  


 


 


Red Cell Distribution Width 11.9   11.8    


 


Platelet Count 186   170    


 


Mean Platelet Volume 12.3  H 12.4  H  


 


Neutrophils % 54.0   58.9    


 


Lymphocytes % 34.3   30.5    


 


Monocytes % 6.8   6.1    


 


Eosinophils % 4.0   3.4    


 


Basophils % 0.5   0.6    


 


Nucleated Red Blood Cells % 0.0   0.0    


 


Neutrophils # 4.3   3.9    


 


Lymphocytes # 2.7   2.0    


 


Monocytes # 0.5   0.4    


 


Eosinophils # 0.3   0.2    


 


Basophils # 0.0   0.0    


 


Nucleated Red Blood Cells # 0.0   0.0    


 


Prothrombin Time 12.0  L   


 


Prothrombin Time Ratio 0.9     


 


INR International Normalized


Ratio 0.89  


  


  


  


 


 


Activated Partial


Thromboplast Time 26.6  


  


  


  


 


 


Sodium Level 145  H 143    


 


Potassium Level 4.4   4.1    


 


Chloride Level 100   98    


 


Carbon Dioxide Level 30   31    


 


Anion Gap 19  H 18  H  


 


Blood Urea Nitrogen 28  H 24  H  


 


Creatinine 1.17   1.05    


 


Glucose Level 131   240  #H  


 


Calcium Level 9.7   9.2    


 


Total Bilirubin 0.1  L 0.3    


 


Direct Bilirubin 0.00   0.00    


 


Indirect Bilirubin 0.1   0.3    


 


Aspartate Amino Transf


(AST/SGOT) 36  


  39  


  


  


 


 


Alanine Aminotransferase


(ALT/SGPT) 45  


  45  


  


  


 


 


Alkaline Phosphatase 70   54    


 


Creatine Kinase 129   152    162  


 


Creatine Kinase Index 4.8   7.0    5.5  


 


Creatinine Kinase MB (Mass) 6.18  H 10.70  H  8.92  H


 


Troponin I 1.160  *H 1.990  *H  2.070  *H


 


B-Type Natriuretic Peptide 1640  H   


 


Total Protein 7.7   6.7  #  


 


Albumin 4.9   4.3    


 


Globulin 2.80   2.40    


 


Albumin/Globulin Ratio 1.75   1.79    


 


Thyroid Stimulating Hormone


(TSH) 


  4.020  


  


  


 


 


Hemoglobin A1c   10.9  H 


 


Triglycerides Level   124   


 


Cholesterol Level   146   


 


LDL Cholesterol, Calculated   86   


 


HDL Cholesterol   35   


 


Cholesterol/HDL Ratio   4.1   














Test


  7/16/17


07:31 7/16/17


07:42 


  


 


 


Sodium Level 141     


 


Potassium Level 4.2     


 


Chloride Level 97     


 


Carbon Dioxide Level 29     


 


Anion Gap 19  H   


 


Blood Urea Nitrogen 25  H   


 


Creatinine 0.91     


 


Glucose Level 172     


 


Calcium Level 9.7     


 


Total Bilirubin 0.3     


 


Direct Bilirubin 0.00     


 


Indirect Bilirubin 0.3     


 


Aspartate Amino Transf


(AST/SGOT) 38  


  


  


  


 


 


Alanine Aminotransferase


(ALT/SGPT) 41  


  


  


  


 


 


Alkaline Phosphatase 57     


 


Total Protein 6.3     


 


Albumin 4.1     


 


Globulin 2.20     


 


Albumin/Globulin Ratio 1.86     


 


Bedside Glucose  173    











Medications


Medications





 Current Medications


Ondansetron HCl (Zofran Inj) 4 mg Q6H  PRN IV NAUSEA AND/OR VOMITING;  Start 7/ 16/17 at 00:30


Aspirin (Aspirin) 81 mg DAILY PO  Last administered on 7/16/17at 08:23; Admin 

Dose 81 MG;  Start 7/16/17 at 09:00


Acetaminophen (Tylenol Tab) 650 mg Q6H  PRN PO PAIN LEVEL 1-3 OR FEVER;  Start 7 /16/17 at 00:30


Morphine Sulfate (morphine) 2 mg Q4H  PRN IV PAIN LEVEL 7-10;  Start 7/16/17 at 

00:30


Pantoprazole (Protonix Iv) 40 mg DAILY@06 IV  Last administered on 7/16/17at 07:

24; Admin Dose 40 MG;  Start 7/16/17 at 06:00


Insulin Glargine (Lantus) 15 unit DAILY@20 SC ;  Start 7/16/17 at 20:00


Diagnostic Test (Pha) (Accu-Chek) 1 ea 02 XX ;  Start 7/17/17 at 02:00


Miscellaneous Information 1 ea NOTE XX ;  Start 7/16/17 at 07:00


Glucose (Glutose) 15 gm Q15M  PRN PO DECREASED GLUCOSE;  Start 7/16/17 at 07:00


Glucose (Glutose) 22.5 gm Q15M  PRN PO DECREASED GLUCOSE;  Start 7/16/17 at 07:

00


Dextrose (D50w Syringe) 25 ml Q15M  PRN IV DECREASED GLUCOSE;  Start 7/16/17 at 

07:00


Dextrose (D50w Syringe) 50 ml Q15M  PRN IV DECREASED GLUCOSE;  Start 7/16/17 at 

07:00


Glucagon (Glucagen) 1 mg Q15M  PRN IM DECREASED GLUCOSE;  Start 7/16/17 at 07:00


Glucose (Glutose) 15 gm Q15M  PRN BUCCAL DECREASED GLUCOSE;  Start 7/16/17 at 07

:00


Atorvastatin Calcium (Lipitor) 40 mg HS PO ;  Start 7/16/17 at 21:00


Metoprolol Tartrate (Lopressor) 25 mg BID PO ;  Start 7/16/17 at 09:30











SONIYA FERGUSON Jul 16, 2017 09:53

## 2017-07-16 NOTE — HP
Date/Time of Note


Date/Time of Note


DATE: 7/16/17 


TIME: 03:44





Assessment/Plan


VTE Prophylaxis


VTE Prophylaxis Intervention:  SCD's





Lines/Catheters


IV Catheter Type (from [facility]):  Saline Lock


Urinary Cath still in place:  No





Assessment/Plan


Chief Complaint/Hosp Course


This is a 67-year-old male being admitted to the telemetry floor for:





#1 chest pain: NSTEMI versus aortic dissection versus PE.  Patient's blood 

pressure right now is within acceptable values.  He is currently chest pain-

free.  However CT scan was nondiagnostic to rule out PE or aortic dissection 

secondary to extravasation of the contrast.  Repeat CT scan was not able to be 

done as he had a significant load of contrast dye would be risk for kidney 

injury as his creatinine was 1.17.  At the current time patient is chest pain-

free.  We will continue to trend troponin.  Initial one was 1.16.  Will provide 

nitroglycerin and morphine for pain control.  I am hesitant to initiate a 

heparin drip right now if this truly is an NSTEMI secondary to patient's 

initial presentation of pain radiating to the back and with the CT chest 

showing aortic root dilatation and there being a risk of possibly causing 

worsening of symptoms if this is an underlying dissection by giving heparin 

drip.  Will continue to monitor the patient and consult with cardiology and 

cardiothoracic surgery.  As patient received a significant load of contrast as 

the try twice a do a CT of the chest we may need to do a JEWELS to assess for 

dissection.  At the current time patient is not complaining of any tearing or 

ripping back pain.  Chest x-ray does appear normal with no widening mediastinum

, however still we will consult with cardiology and cardiothoracic surgery 

before proceeding with anticoagulation.





#2 hypertension: We will provide low-dose beta-blockade at this time patient 

currently in does not recall his home medications.





#3 diabetes mellitus: We will check a hemoglobin A1c.  Patient does not recall 

all medications he denies use of any insulin right now.  Will put patient on 

insulin sliding scale.





#4  Renal insufficiency: Creatinine of 1.17.  There are no previous lab results 

for this patient.  He did receive a contrast load today with for his CT scan.  

Will monitor renal function.  Will consult nephrology.





 DVT and GI prophylaxis: SCDs, Protonix


Problems:  





HPI/ROS


Admit Date/Time


Admit Date/Time


Jul 15, 2017 at 19:38





Hx of Present Illness


Chief complaint: Intermittent chest pain 2 weeks





This is a very pleasant 67-year-old male, [place]-speaking with a history of 

diabetes and hypertension that presents to the emergency department complaining 

of 2 weeks of intermittent chest pain.  The patient indicates that the pain 

initially began in his left arm was a pressure-like sensation.  The pain began 

to spread to his neck and chest wall.  He states the pain is localized to the 

left chest with no radiation to the right side.  He also indicates that he has 

been experiencing back pain on the left-hand side.  He denies any fever shaking 

or chills.  He has no shortness of breath at rest or exertion.  He denies any 

recent remote trauma.  He states he has never had any similar symptoms in the 

past.  He denies any recent exertion or heavy lifting.  He is right-handed 

dominant.  He did indicate that just prior to arrival he attempted to lift a 

heavy object and this significantly exacerbated the pain to 10 out of 10 in 

intensity.  He denies any weakness of the upper or lower extremities.  He 

denies any lower back pain or abdominal pain.  Denies any recent bleeding of 

any sort.





At the present time on my examination, patient is chest pain-free.





Allergies: NKDA





Medications: See MAR





ROS


Const: As per HPI


Eyes : No pain discharge or redness or change in visual acuity


ENT: No pain, sore throat, congestion, congestion,  dysphagia or discharge


Respiratory: No shortness of breath, cough, sputum, wheezing, or pleuritic pain


Cardiovascular: As per HPI


GI : no change in appetite, abdominal pain, nausea, vomiting, diarrhea, 

constipation, or change in the color his stool 


Genitourinary: No dysuria, hematuria, flank pain ,  discharge or CVA tenderness


Musculoskeletal: As per HPI


Skin: No rash, bruising or hives 


Neuro: No headache, dizziness, syncope, seizure, focal weakness


Endocrine: No polyuria, polydipsia, temperature intolerance


Psych: No hallucination, depression, anxiety or suicidal ideation





PMH/Family/Social


Past Medical History


Hypertension, diabetes





Past Surgical History


Past Surgical Hx:  no surgical history





Family History


Significant Family History:  no pertinent family hx





Social History


Alcohol Use:  heavy (Patient states that 3 weeks ago he had his last drink, 

however he does state that he drinks approximately a 6 pack a day.)


Smoking Status:  Never smoker


Drug Use:  none





Exam/Review of Systems


Vital Signs


Vitals





 Vital Signs








  Date Time  Temp Pulse Resp B/P Pulse Ox O2 Delivery O2 Flow Rate FiO2


 


7/16/17 02:50 98.2 65 20 133/72 97 Room Air  











Exam


Exam





General: Patient is lying comfortably in bed in no acute distress and is very 

pleasant.


HEENT: Atraumatic, normocephalic. The pupils are equal, round and reactive. 

Extraocular motor are intact


Neck: Supple with full range of motion. No rigidity or meningismus


Chest: Nontender


Lungs: Clear to auscultation bilaterally no crackles rales or wheezing


Heart: Normal S1-S2, Regular rhythm and rate.  No overt murmur appreciated on 

auscultation.


Abdomen: Soft , nontender,  nondistended , bowel sounds are present. No 

guarding no rebound tenderness , No masses or organomegaly. No costovertebral 

temporal angle mass


Extremities: Normal to inspection, no edema no cyanosis


Neurologic: Normal mental status, speech normal, cranial nerves II through XII 

are intact, motor and sensory are intact, no focal weakness


Additional Comments


PROCEDURE:   CT Chest with IV contrast. 


 


CLINICAL INDICATION:   Chest pain. 


 


TECHNIQUE:   CT scan of the chest was performed on a multidetector scanner.  

The patient was scanned following the uncomplicated intravenous administration 

of 100 cc of Visapaque 320 contrast.  3D, coronal and sagittal reformatted 

images were obtained from the axial source images. Images were reviewed on a 

high-resolution PACS workstation. The total exam CTDlvol = 355 mGy and DLP = 

809 mGy-cm. One of the following 3 dose reduction techniques were used: 

Automated exposure control; adjustment of the mA and/or kV according to patient 

size; or use of iterative reconstruction technique.


 


COMPARISON:   07/15/2017 


 


FINDINGS:


 


Per CT technician report, there was extravasation of the patient's intravenous 

contrast injection.  Study is nondiagnostic as there is no significant 

intravascular contrast bolus within the pulmonary arteries or aorta.


 


There is aneurysmal dilatation of the ascending aorta measuring 3.9 cm AP 

diameter.  Descending aorta is normal in caliber..  There is no mediastinal or 

hilar lymphadenopathy or mass.  Heart is normal size.  There is trace fluid 

within the pericardial recesses.


 


There is mild dependent atelectasis.  No focal infiltrate.  There is no pleural 

effusion.  There is no pneumothorax.


 


There are no fractures.  There are degenerative changes of the thoracic spine.


 


Imaging obtained through the upper abdomen reveals no acute abnormality.   


 


IMPRESSION:


1.  Nondiagnostic examination for pulmonary emboli or aortic dissection.  No 

significant intravascular contrast due to contrast extravasation at the 

injection site per CT technician.


2.  Aneurysmal dilatation of the ascending aorta.


3.  Trace fluid in the pericardial recess.


4.  Mild dependent atelectasis.  No focal infiltrate.


5.  Degenerative changes of the thoracic spine.


 


 


RPTAT:  HMVK


_____________________________________________ 


.Skinny Arreaga MD, MD           Date    Time 


Electronically viewed and signed by .Skinny Arreaga MD, MD on 07/15/2017 21:41





Labs


Result Diagram:  


7/15/17 1740                                                                   

             7/15/17 1740








Medications


Medications





 Current Medications


Ondansetron HCl (Zofran Inj) 4 mg Q6H  PRN IV NAUSEA AND/OR VOMITING;  Start 7/ 16/17 at 00:30


Aspirin (Aspirin) 81 mg DAILY PO ;  Start 7/16/17 at 09:00


Acetaminophen (Tylenol Tab) 650 mg Q6H  PRN PO PAIN LEVEL 1-3 OR FEVER;  Start 7 /16/17 at 00:30


Morphine Sulfate (morphine) 2 mg Q4H  PRN IV PAIN LEVEL 7-10;  Start 7/16/17 at 

00:30


Pantoprazole (Protonix Iv) 40 mg DAILY@06 IV ;  Start 7/16/17 at 06:00











SEGUN MAIER Jul 16, 2017 04:01

## 2017-07-17 VITALS — DIASTOLIC BLOOD PRESSURE: 79 MMHG | HEART RATE: 70 BPM | SYSTOLIC BLOOD PRESSURE: 143 MMHG | RESPIRATION RATE: 24 BRPM

## 2017-07-17 VITALS — RESPIRATION RATE: 14 BRPM | HEART RATE: 66 BPM | DIASTOLIC BLOOD PRESSURE: 71 MMHG | SYSTOLIC BLOOD PRESSURE: 132 MMHG

## 2017-07-17 VITALS — HEART RATE: 62 BPM | RESPIRATION RATE: 18 BRPM | SYSTOLIC BLOOD PRESSURE: 120 MMHG | DIASTOLIC BLOOD PRESSURE: 72 MMHG

## 2017-07-17 VITALS — DIASTOLIC BLOOD PRESSURE: 63 MMHG | SYSTOLIC BLOOD PRESSURE: 103 MMHG | RESPIRATION RATE: 12 BRPM | HEART RATE: 58 BPM

## 2017-07-17 VITALS — HEART RATE: 58 BPM | SYSTOLIC BLOOD PRESSURE: 107 MMHG | RESPIRATION RATE: 17 BRPM | DIASTOLIC BLOOD PRESSURE: 69 MMHG

## 2017-07-17 VITALS — RESPIRATION RATE: 18 BRPM | DIASTOLIC BLOOD PRESSURE: 84 MMHG | SYSTOLIC BLOOD PRESSURE: 137 MMHG

## 2017-07-17 VITALS — HEART RATE: 57 BPM

## 2017-07-17 VITALS — RESPIRATION RATE: 15 BRPM | SYSTOLIC BLOOD PRESSURE: 103 MMHG | HEART RATE: 58 BPM | DIASTOLIC BLOOD PRESSURE: 67 MMHG

## 2017-07-17 VITALS — SYSTOLIC BLOOD PRESSURE: 106 MMHG | DIASTOLIC BLOOD PRESSURE: 70 MMHG | HEART RATE: 58 BPM | RESPIRATION RATE: 14 BRPM

## 2017-07-17 VITALS — RESPIRATION RATE: 15 BRPM | DIASTOLIC BLOOD PRESSURE: 73 MMHG | SYSTOLIC BLOOD PRESSURE: 120 MMHG | HEART RATE: 58 BPM

## 2017-07-17 VITALS — RESPIRATION RATE: 15 BRPM | SYSTOLIC BLOOD PRESSURE: 115 MMHG | DIASTOLIC BLOOD PRESSURE: 74 MMHG | HEART RATE: 60 BPM

## 2017-07-17 VITALS — HEART RATE: 72 BPM | DIASTOLIC BLOOD PRESSURE: 80 MMHG | RESPIRATION RATE: 18 BRPM | SYSTOLIC BLOOD PRESSURE: 145 MMHG

## 2017-07-17 VITALS — HEART RATE: 68 BPM | DIASTOLIC BLOOD PRESSURE: 75 MMHG | SYSTOLIC BLOOD PRESSURE: 138 MMHG | RESPIRATION RATE: 13 BRPM

## 2017-07-17 VITALS — RESPIRATION RATE: 13 BRPM | HEART RATE: 60 BPM

## 2017-07-17 VITALS — SYSTOLIC BLOOD PRESSURE: 107 MMHG | DIASTOLIC BLOOD PRESSURE: 71 MMHG | RESPIRATION RATE: 14 BRPM | HEART RATE: 60 BPM

## 2017-07-17 VITALS — DIASTOLIC BLOOD PRESSURE: 76 MMHG | SYSTOLIC BLOOD PRESSURE: 142 MMHG | RESPIRATION RATE: 16 BRPM | HEART RATE: 66 BPM

## 2017-07-17 VITALS — SYSTOLIC BLOOD PRESSURE: 143 MMHG | DIASTOLIC BLOOD PRESSURE: 79 MMHG | RESPIRATION RATE: 16 BRPM | HEART RATE: 66 BPM

## 2017-07-17 VITALS — DIASTOLIC BLOOD PRESSURE: 74 MMHG | SYSTOLIC BLOOD PRESSURE: 135 MMHG | RESPIRATION RATE: 18 BRPM | HEART RATE: 66 BPM

## 2017-07-17 VITALS — HEART RATE: 58 BPM | DIASTOLIC BLOOD PRESSURE: 69 MMHG | SYSTOLIC BLOOD PRESSURE: 109 MMHG | RESPIRATION RATE: 13 BRPM

## 2017-07-17 VITALS — HEART RATE: 56 BPM | SYSTOLIC BLOOD PRESSURE: 111 MMHG | DIASTOLIC BLOOD PRESSURE: 65 MMHG | RESPIRATION RATE: 13 BRPM

## 2017-07-17 VITALS — DIASTOLIC BLOOD PRESSURE: 66 MMHG | SYSTOLIC BLOOD PRESSURE: 111 MMHG | RESPIRATION RATE: 19 BRPM

## 2017-07-17 VITALS — RESPIRATION RATE: 15 BRPM | SYSTOLIC BLOOD PRESSURE: 119 MMHG | DIASTOLIC BLOOD PRESSURE: 70 MMHG | HEART RATE: 66 BPM

## 2017-07-17 VITALS — RESPIRATION RATE: 13 BRPM | HEART RATE: 58 BPM | SYSTOLIC BLOOD PRESSURE: 98 MMHG | DIASTOLIC BLOOD PRESSURE: 69 MMHG

## 2017-07-17 VITALS — DIASTOLIC BLOOD PRESSURE: 79 MMHG | HEART RATE: 78 BPM | RESPIRATION RATE: 25 BRPM | SYSTOLIC BLOOD PRESSURE: 150 MMHG

## 2017-07-17 VITALS — RESPIRATION RATE: 18 BRPM | SYSTOLIC BLOOD PRESSURE: 110 MMHG | DIASTOLIC BLOOD PRESSURE: 70 MMHG

## 2017-07-17 VITALS — DIASTOLIC BLOOD PRESSURE: 73 MMHG | RESPIRATION RATE: 17 BRPM | SYSTOLIC BLOOD PRESSURE: 136 MMHG | HEART RATE: 66 BPM

## 2017-07-17 VITALS — DIASTOLIC BLOOD PRESSURE: 68 MMHG | SYSTOLIC BLOOD PRESSURE: 112 MMHG | RESPIRATION RATE: 12 BRPM | HEART RATE: 58 BPM

## 2017-07-17 VITALS — RESPIRATION RATE: 14 BRPM | DIASTOLIC BLOOD PRESSURE: 74 MMHG | HEART RATE: 66 BPM | SYSTOLIC BLOOD PRESSURE: 132 MMHG

## 2017-07-17 VITALS — HEART RATE: 60 BPM

## 2017-07-17 VITALS — RESPIRATION RATE: 15 BRPM | SYSTOLIC BLOOD PRESSURE: 110 MMHG | HEART RATE: 62 BPM | DIASTOLIC BLOOD PRESSURE: 72 MMHG

## 2017-07-17 VITALS — DIASTOLIC BLOOD PRESSURE: 71 MMHG | HEART RATE: 58 BPM | RESPIRATION RATE: 14 BRPM | SYSTOLIC BLOOD PRESSURE: 109 MMHG

## 2017-07-17 VITALS — HEART RATE: 60 BPM | RESPIRATION RATE: 15 BRPM | SYSTOLIC BLOOD PRESSURE: 114 MMHG | DIASTOLIC BLOOD PRESSURE: 69 MMHG

## 2017-07-17 VITALS — SYSTOLIC BLOOD PRESSURE: 104 MMHG | RESPIRATION RATE: 18 BRPM | DIASTOLIC BLOOD PRESSURE: 65 MMHG

## 2017-07-17 VITALS — HEART RATE: 79 BPM

## 2017-07-17 VITALS — HEART RATE: 66 BPM

## 2017-07-17 LAB
ADD SCAN DIFF: NO
ANION GAP SERPL CALC-SCNC: 18 MMOL/L (ref 8–16)
BASOPHILS # BLD AUTO: 0 10^3/UL (ref 0–0.1)
BASOPHILS NFR BLD: 0.6 % (ref 0–2)
BUN SERPL-MCNC: 18 MG/DL (ref 7–20)
CALCIUM SERPL-MCNC: 8.9 MG/DL (ref 8.4–10.2)
CHLORIDE SERPL-SCNC: 101 MMOL/L (ref 97–110)
CO2 SERPL-SCNC: 26 MMOL/L (ref 21–31)
CREAT SERPL-MCNC: 0.81 MG/DL (ref 0.61–1.24)
EOSINOPHIL # BLD: 0.2 10^3/UL (ref 0–0.5)
EOSINOPHIL NFR BLD: 3.1 % (ref 0–7)
ERYTHROCYTE [DISTWIDTH] IN BLOOD BY AUTOMATED COUNT: 11.9 % (ref 11.5–14.5)
GLUCOSE SERPL-MCNC: 138 MG/DL (ref 70–220)
HCT VFR BLD CALC: 42 % (ref 42–52)
HGB BLD-MCNC: 14.2 G/DL (ref 14–18)
LYMPHOCYTES # BLD AUTO: 2.2 10^3/UL (ref 0.8–2.9)
LYMPHOCYTES NFR BLD AUTO: 33.1 % (ref 15–51)
MAGNESIUM SERPL-MCNC: 1.7 MG/DL (ref 1.7–2.5)
MCH RBC QN AUTO: 30.9 PG (ref 29–33)
MCHC RBC AUTO-ENTMCNC: 33.8 G/DL (ref 32–37)
MCV RBC AUTO: 91.3 FL (ref 82–101)
MONOCYTES # BLD: 0.4 10^3/UL (ref 0.3–0.9)
MONOCYTES NFR BLD: 6.4 % (ref 0–11)
NEUTROPHILS # BLD: 3.7 10^3/UL (ref 1.6–7.5)
NEUTROPHILS NFR BLD AUTO: 56.5 % (ref 39–77)
NRBC # BLD MANUAL: 0 10^3/UL (ref 0–0)
NRBC BLD QL: 0 /100WBC (ref 0–0)
PLATELET # BLD: 176 10^3/UL (ref 140–415)
PMV BLD AUTO: 12.2 FL (ref 7.4–10.4)
POTASSIUM SERPL-SCNC: 4 MMOL/L (ref 3.5–5.1)
RBC # BLD AUTO: 4.6 10^6/UL (ref 4.7–6.1)
SODIUM SERPL-SCNC: 141 MMOL/L (ref 135–144)
WBC # BLD AUTO: 6.5 10^3/UL (ref 4.8–10.8)

## 2017-07-17 PROCEDURE — B211YZZ FLUOROSCOPY OF MULTIPLE CORONARY ARTERIES USING OTHER CONTRAST: ICD-10-PCS | Performed by: INTERNAL MEDICINE

## 2017-07-17 PROCEDURE — 027034Z DILATION OF CORONARY ARTERY, ONE ARTERY WITH DRUG-ELUTING INTRALUMINAL DEVICE, PERCUTANEOUS APPROACH: ICD-10-PCS | Performed by: INTERNAL MEDICINE

## 2017-07-17 PROCEDURE — 4A023N7 MEASUREMENT OF CARDIAC SAMPLING AND PRESSURE, LEFT HEART, PERCUTANEOUS APPROACH: ICD-10-PCS | Performed by: INTERNAL MEDICINE

## 2017-07-17 PROCEDURE — 3E033PZ INTRODUCTION OF PLATELET INHIBITOR INTO PERIPHERAL VEIN, PERCUTANEOUS APPROACH: ICD-10-PCS | Performed by: INTERNAL MEDICINE

## 2017-07-17 RX ADMIN — PANTOPRAZOLE SODIUM SCH MG: 40 INJECTION, POWDER, FOR SOLUTION INTRAVENOUS at 06:29

## 2017-07-17 RX ADMIN — ASPIRIN 81 MG SCH MG: 81 TABLET ORAL at 09:00

## 2017-07-17 RX ADMIN — METOPROLOL TARTRATE SCH MG: 25 TABLET, FILM COATED ORAL at 09:00

## 2017-07-17 RX ADMIN — INSULIN GLARGINE SCH UNIT: 100 INJECTION, SOLUTION SUBCUTANEOUS at 08:00

## 2017-07-17 RX ADMIN — ATORVASTATIN CALCIUM SCH MG: 40 TABLET, FILM COATED ORAL at 21:30

## 2017-07-17 RX ADMIN — HEPARIN SODIUM AND DEXTROSE SCH MLS/HR: 10000; 5 INJECTION INTRAVENOUS at 10:09

## 2017-07-17 NOTE — CONS
Date/Time of Note


Date/Time of Note


DATE: 7/17/17 


TIME: 10:26





Assessment/Plan


Assessment/Plan


Chief Complaint/Hosp Course


NSTEMI: Trop up to 2. CTA negative for dissection. Plan for cath 


DM


HTN





-cath this am


-ASA


-lipitor 40mg


-metoprolol 25mg BID


Problems:  





Consultation Date/Type/Reason


Admit Date/Time


Jul 15, 2017 at 19:38


Initial Consult Date


7/16/17


Type of Consultation:  Cardiology


Referring Provider:  SEGUN MAIER





24 HR Interval Summary


Free Text/Dictation


Chest pain this am walking to the restroom.





Exam/Review of Systems


Vital Signs


Vitals





 Vital Signs








  Date Time  Temp Pulse Resp B/P Pulse Ox O2 Delivery O2 Flow Rate FiO2


 


7/17/17 08:00  66      


 


7/17/17 07:38 98.0  18 137/84 98   


 


7/16/17 08:00      Nasal Cannula 2.0 














 Intake and Output   


 


 7/16/17 7/16/17 7/17/17





 15:00 23:00 07:00


 


Intake Total 500 ml  700 ml


 


Output Total   950 ml


 


Balance 500 ml  -250 ml











Exam


Constitutional:  alert, oriented


Psych:  nl mood/affect, no complaints


Head:  atraumatic, normocephalic


Neck:  No jvd


Respiratory:  clear to auscultation, 


   No crackles/rales


Cardiovascular:  regular rate and rhythm, 


   No edema


Gastrointestinal:  non-tender, soft


Neurological:  nl mental status, nl speech





Results


Result Diagram:  


7/17/17 0842                                                                   

             7/17/17 0842





Results 24 hrs





Laboratory Tests








Test


  7/16/17


11:14 7/16/17


11:26 7/16/17


11:34 7/16/17


17:06


 


Prothrombin Time 13.8     


 


Prothrombin Time Ratio 1.1     


 


INR International Normalized


Ratio 1.06  


  


  


  


 


 


Activated Partial


Thromboplast Time 27.8  


  


  


  


 


 


Bedside Glucose  182    134  


 


White Blood Count   6.3   


 


Red Blood Count   4.44  L 


 


Hemoglobin   13.6  L 


 


Hematocrit   40.9  L 


 


Mean Corpuscular Volume   92.1   


 


Mean Corpuscular Hemoglobin   30.6   


 


Mean Corpuscular Hemoglobin


Concent 


  


  33.3  


  


 


 


Red Cell Distribution Width   11.9   


 


Platelet Count   172   


 


Mean Platelet Volume   12.4  H 


 


Neutrophils %   60.5   


 


Lymphocytes %   28.7   


 


Monocytes %   6.8   


 


Eosinophils %   2.7   


 


Basophils %   0.8   


 


Nucleated Red Blood Cells %   0.0   


 


Neutrophils #   3.8   


 


Lymphocytes #   1.8   


 


Monocytes #   0.4   


 


Eosinophils #   0.2   


 


Basophils #   0.1   


 


Nucleated Red Blood Cells #   0.0   














Test


  7/16/17


17:50 7/16/17


20:44 7/17/17


01:50 7/17/17


08:42


 


Activated Partial


Thromboplast Time 42.1  H


  


  64.8  H


  95.6  *H


 


 


Bedside Glucose  133    


 


White Blood Count    6.5  


 


Red Blood Count    4.60  L


 


Hemoglobin    14.2  


 


Hematocrit    42.0  


 


Mean Corpuscular Volume    91.3  


 


Mean Corpuscular Hemoglobin    30.9  


 


Mean Corpuscular Hemoglobin


Concent 


  


  


  33.8  


 


 


Red Cell Distribution Width    11.9  


 


Platelet Count    176  


 


Mean Platelet Volume    12.2  H


 


Neutrophils %    56.5  


 


Lymphocytes %    33.1  


 


Monocytes %    6.4  


 


Eosinophils %    3.1  


 


Basophils %    0.6  


 


Nucleated Red Blood Cells %    0.0  


 


Neutrophils #    3.7  


 


Lymphocytes #    2.2  


 


Monocytes #    0.4  


 


Eosinophils #    0.2  


 


Basophils #    0.0  


 


Nucleated Red Blood Cells #    0.0  


 


Sodium Level    141  


 


Potassium Level    4.0  


 


Chloride Level    101  


 


Carbon Dioxide Level    26  


 


Anion Gap    18  H


 


Blood Urea Nitrogen    18  


 


Creatinine    0.81  


 


Glucose Level    138  


 


Calcium Level    8.9  


 


Magnesium Level    1.7  














Test


  7/17/17


08:45 


  


  


 


 


Bedside Glucose 142     











Medications


Medications





 Current Medications


Ondansetron HCl (Zofran Inj) 4 mg Q6H  PRN IV NAUSEA AND/OR VOMITING;  Start 7/ 16/17 at 00:30


Aspirin (Aspirin) 81 mg DAILY PO  Last administered on 7/16/17at 08:23; Admin 

Dose 81 MG;  Start 7/16/17 at 09:00


Acetaminophen (Tylenol Tab) 650 mg Q6H  PRN PO PAIN LEVEL 1-3 OR FEVER;  Start 7 /16/17 at 00:30


Morphine Sulfate (morphine) 2 mg Q4H  PRN IV PAIN LEVEL 7-10;  Start 7/16/17 at 

00:30


Pantoprazole (Protonix Iv) 40 mg DAILY@06 IV  Last administered on 7/17/17at 06:

29; Admin Dose 40 MG;  Start 7/16/17 at 06:00


Diagnostic Test (Pha) (Accu-Chek) 1 ea 02 XX ;  Start 7/17/17 at 02:00


Miscellaneous Information 1 ea NOTE XX ;  Start 7/16/17 at 07:00


Glucose (Glutose) 15 gm Q15M  PRN PO DECREASED GLUCOSE;  Start 7/16/17 at 07:00


Glucose (Glutose) 22.5 gm Q15M  PRN PO DECREASED GLUCOSE;  Start 7/16/17 at 07:

00


Dextrose (D50w Syringe) 25 ml Q15M  PRN IV DECREASED GLUCOSE;  Start 7/16/17 at 

07:00


Dextrose (D50w Syringe) 50 ml Q15M  PRN IV DECREASED GLUCOSE;  Start 7/16/17 at 

07:00


Glucagon (Glucagen) 1 mg Q15M  PRN IM DECREASED GLUCOSE;  Start 7/16/17 at 07:00


Glucose (Glutose) 15 gm Q15M  PRN BUCCAL DECREASED GLUCOSE;  Start 7/16/17 at 07

:00


Atorvastatin Calcium (Lipitor) 40 mg HS PO  Last administered on 7/16/17at 20:52

; Admin Dose 40 MG;  Start 7/16/17 at 21:00


Metoprolol Tartrate (Lopressor) 25 mg BID PO  Last administered on 7/16/17at 10:

40; Admin Dose 25 MG;  Start 7/16/17 at 09:30


Insulin Glargine (Lantus) 13 unit DAILY@08 SC  Last administered on 7/16/17at 12

:01; Admin Dose 13 UNIT;  Start 7/16/17 at 11:00











SONIYA FERGUSON Jul 17, 2017 10:27

## 2017-07-17 NOTE — PN
Date/Time of Note


Date/Time of Note


DATE: 7/17/17 


TIME: 13:48





Assessment/Plan


Lines/Catheters


IV Catheter Type (from Nrsg):  Saline Lock


Rivera in Place (from Nrsg):  No





Assessment/Plan


Chief Complaint/Hosp Course


Aortic aneurysm


Problems:  


Assessment/Plan


Additional Assessment/Plan


This is a 67-year-old male with a history of hypertension and diabetes.  

Patient was admitted with chest pain was diagnosed with non-ST elevation MI 

part of his workup which which included a CAT scan of the chest revealed an 

ascending aortic aneurysm 3.9 cm the first CAT scan was inconclusive for 

dissection the second CAT scan has showed there is no dissection patient does 

not have any typical pain that is for aortic dissection and is hemodynamically 

stable


We will monitor the aortic aneurysm.  Repeat CAT scan in 6 months





Subjective


24 Hr Interval Summary


Constitutional:  improved


Pain Control:  mild





Exam/Review of Systems


Vital Signs


Vitals





 Vital Signs








  Date Time  Temp Pulse Resp B/P Pulse Ox O2 Delivery O2 Flow Rate FiO2


 


7/17/17 12:59  60 14 107/71 100 Nasal Cannula 3.0 


 


7/17/17 12:39 98.0       














 Intake and Output   


 


 7/16/17 7/16/17 7/17/17





 15:00 23:00 07:00


 


Intake Total 500 ml  700 ml


 


Output Total   950 ml


 


Balance 500 ml  -250 ml











Exam


ENMT:  mucosa pink and moist, nl external ears & nose, nl lips & teeth, nl 

nasal mucosa & septum


Neck:  non-tender, supple


Respiratory:  clear to auscultation, normal air movement


Cardiovascular:  nl pulses, regular rate and rhythm





Results


Result Diagram:  


7/17/17 0842                                                                   

             7/17/17 0842














KENNA JONES MD Jul 17, 2017 13:49

## 2017-07-17 NOTE — PN
Date/Time of Note


Date/Time of Note


DATE: 7/17/17 


TIME: 14:13





Assessment/Plan


VTE Prophylaxis


VTE Prophylaxis Intervention:  SCD's





Lines/Catheters


IV Catheter Type (from [facility]):  Saline Lock


Urinary Cath still in place:  No





Assessment/Plan


Chief Complaint/Hosp Course





#1 NSTEMI status post PCI with stent placement in the ostial





#2  Ascending aortic aneurysm


Cardiothoracic surgery consultation appreciated, plan is for repeat CT in 6 

months





#3 diabetes mellitus: We will check a hemoglobin A1c.  Patient does not recall 

all medications he denies use of any insulin right now.  Will put patient on 

insulin sliding scale.





#4  Acute kidney injury-resolved





#5 Hypertension: Patient's blood pressures on the lower side this time


Continue Coreg as tolerated





 DVT and GI prophylaxis: SCDs, Protonix


Problems:  





Subjective


24 Hr Interval Summary


Constitutional:  no complaints





Exam/Review of Systems


Vital Signs


Vitals





 Vital Signs








  Date Time  Temp Pulse Resp B/P Pulse Ox O2 Delivery O2 Flow Rate FiO2


 


7/17/17 12:59  60 14 107/71 100 Nasal Cannula 3.0 


 


7/17/17 12:39 98.0       














 Intake and Output   


 


 7/16/17 7/16/17 7/17/17





 15:00 23:00 07:00


 


Intake Total 500 ml  700 ml


 


Output Total   950 ml


 


Balance 500 ml  -250 ml











Exam


Constitutional:  alert


Respiratory:  clear to auscultation


Cardiovascular:  regular rate and rhythm


Gastrointestinal:  soft, 


   No distended


Musculoskeletal:  nl extremities to inspection





Results


Result Diagram:  


7/17/17 0842                                                                   

             7/17/17 0842





Results 24 hrs





Laboratory Tests








Test


  7/16/17


17:06 7/16/17


17:50 7/16/17


20:44 7/17/17


01:50


 


Bedside Glucose 134    133   


 


Activated Partial


Thromboplast Time 


  42.1  H


  


  64.8  H


 














Test


  7/17/17


08:42 7/17/17


08:45 


  


 


 


White Blood Count 6.5     


 


Red Blood Count 4.60  L   


 


Hemoglobin 14.2     


 


Hematocrit 42.0     


 


Mean Corpuscular Volume 91.3     


 


Mean Corpuscular Hemoglobin 30.9     


 


Mean Corpuscular Hemoglobin


Concent 33.8  


  


  


  


 


 


Red Cell Distribution Width 11.9     


 


Platelet Count 176     


 


Mean Platelet Volume 12.2  H   


 


Neutrophils % 56.5     


 


Lymphocytes % 33.1     


 


Monocytes % 6.4     


 


Eosinophils % 3.1     


 


Basophils % 0.6     


 


Nucleated Red Blood Cells % 0.0     


 


Neutrophils # 3.7     


 


Lymphocytes # 2.2     


 


Monocytes # 0.4     


 


Eosinophils # 0.2     


 


Basophils # 0.0     


 


Nucleated Red Blood Cells # 0.0     


 


Activated Partial


Thromboplast Time 95.6  *H


  


  


  


 


 


Sodium Level 141     


 


Potassium Level 4.0     


 


Chloride Level 101     


 


Carbon Dioxide Level 26     


 


Anion Gap 18  H   


 


Blood Urea Nitrogen 18     


 


Creatinine 0.81     


 


Glucose Level 138     


 


Calcium Level 8.9     


 


Magnesium Level 1.7     


 


Bedside Glucose  142    











Medications


Medications





 Current Medications


Ondansetron HCl (Zofran Inj) 4 mg Q6H  PRN IV NAUSEA AND/OR VOMITING;  Start 7/ 16/17 at 00:30


Aspirin (Aspirin) 81 mg DAILY PO  Last administered on 7/16/17at 08:23; Admin 

Dose 81 MG;  Start 7/16/17 at 09:00


Acetaminophen (Tylenol Tab) 650 mg Q6H  PRN PO PAIN LEVEL 1-3 OR FEVER;  Start 7 /16/17 at 00:30


Morphine Sulfate (morphine) 2 mg Q4H  PRN IV PAIN LEVEL 7-10;  Start 7/16/17 at 

00:30


Pantoprazole (Protonix Iv) 40 mg DAILY@06 IV  Last administered on 7/17/17at 06:

29; Admin Dose 40 MG;  Start 7/16/17 at 06:00


Diagnostic Test (Pha) (Accu-Chek) 1 ea 02 XX ;  Start 7/17/17 at 02:00


Miscellaneous Information 1 ea NOTE XX ;  Start 7/16/17 at 07:00


Glucose (Glutose) 15 gm Q15M  PRN PO DECREASED GLUCOSE;  Start 7/16/17 at 07:00


Glucose (Glutose) 22.5 gm Q15M  PRN PO DECREASED GLUCOSE;  Start 7/16/17 at 07:

00


Dextrose (D50w Syringe) 25 ml Q15M  PRN IV DECREASED GLUCOSE;  Start 7/16/17 at 

07:00


Dextrose (D50w Syringe) 50 ml Q15M  PRN IV DECREASED GLUCOSE;  Start 7/16/17 at 

07:00


Glucagon (Glucagen) 1 mg Q15M  PRN IM DECREASED GLUCOSE;  Start 7/16/17 at 07:00


Glucose (Glutose) 15 gm Q15M  PRN BUCCAL DECREASED GLUCOSE;  Start 7/16/17 at 07

:00


Atorvastatin Calcium (Lipitor) 40 mg HS PO  Last administered on 7/16/17at 20:52

; Admin Dose 40 MG;  Start 7/16/17 at 21:00


Insulin Glargine (Lantus) 13 unit DAILY@08 SC  Last administered on 7/16/17at 12

:01; Admin Dose 13 UNIT;  Start 7/16/17 at 11:00


Morphine Sulfate (morphine) 2 mg Q2H  PRN IV FOR NON CARDIAC PAIN (4-10);  

Start 7/17/17 at 12:00


Ondansetron HCl 4 mg 4 mg Q4H  PRN IV NAUSEA AND/OR VOMITING;  Start 7/17/17 at 

12:00


Sodium Chloride (NS) 1,000 ml @  75 mls/hr [id number] IV  Last administered on 7/17 /17at 13:34; Admin Dose 75 MLS/HR;  Start 7/17/17 at 11:58;  Stop 7/17/17 at 16:

57


Clopidogrel Bisulfate (plaVIX) 75 mg DAILY PO ;  Start 7/18/17 at 09:00


Carvedilol (Coreg) 6.25 mg BID PO ;  Start 7/17/17 at 21:00











BRUCE LIRIANO Jul 17, 2017 14:16

## 2017-07-17 NOTE — RADRPT
Echocardiogram Report

 

Patient Name:  JOSELUIS BHARDWAJ   Gender:       Male

MRN:           [id number]          Accession #:  [id number]

Birth Date:    1950      Study Date:   16-Jul-2017

Sonographer:   [facility]  Location:     [id number]

 

Ref. Physician: SEGUN MAIER

Quality: Adequate

 

Procedures: Transthoracic echocardiogram with complete 2D, M-Mode, and 

doppler examination.

Indications: Chest Pain, dilatation of aorta.

 

2D/M Mode                          Doppler

Measurement  Value  Normal Ranges  Measurement    Value  Normal Ranges

LVIDd 2D     5.0    3.5 - 5.6 cm   AV Peak Jaxson    1.2    m/sec

LVIDs 2D     3.8    2.1 - 4.1 cm   AV Peak PG     6.0    mmHg

FS 2D        24.8   %              LVOT Peak Jaxson  0.8    m/sec

LVPWd 2D     1.3    0.6 - 1.1 cm   LVOT Peak PG   2.0    mmHg

IVSd 2D      1.3    0.6 - 1.1 cm   MV E Peak Jaxson  0.9    m/sec

IVS/LVPW 2D  1.0                   MV A Peak Jaxson  1.1    m/sec

AoR Diam 2D  3.0    2.0 - 3.7 cm   MV E/A         0.9

LA/Ao 2D     2      0 - 1          MV Decel Time  236    msec

EDV 2D       124.0  cm3            MV E/A         0.9

ESV 2D       52.7   cm3            TR Peak Jaxson    2.2    m/sec

LA Dimen 2D  4.9    2.3 - 4.0 cm   TR Peak PG     19.0   mmHg

 

Findings

Left Ventricle: Normal left ventricular cavity size.  Mild concentric 

left ventricular hypertrophy.  Moderate global left ventricular systolic 

dysfunction.  Ejection fraction is visually estimated at 40 %.  Tissue 

Doppler/Mitral Doppler indices are consistent with impaired relaxation 

(Stage I diastolic dysfunction).

Resting Segmental Wall Motion Analysis: Severe hypokinesis of the mid to 

distal septum and entire apex. Mild hypokinesis of the anterior and 

inferior walls.

Right Ventricle: Normal right ventricular size.

Left Atrium: There is moderate enlargement of left atrium.

Right Atrium: The right atrium is normal in size.

Mitral Valve: Mild mitral leaflet calcification.  Mild mitral annular 

calcification.  Mild mitral valve regurgitation.

Aortic Valve: Normal appearance of the aortic valve.  No significant 

aortic stenosis or insufficiency.

Tricuspid Valve: Normal appearance of the tricuspid valve.  Estimated 

peak PA systolic pressure 22 mmHg.  There is trace tricuspid 

regurgitation.

Pulmonic Valve: Pulmonic valve not well visualized.  There is trace 

pulmonic regurgitation.

Pericardium: Normal pericardium with no significant pericardial effusion.

Aorta: Normal aortic root.

IVC: Normal size and normal respiratory collapse consistent with normal 

right atrial pressure.

 

Conclusions

Normal left ventricular cavity size.  Mild concentric left ventricular 

hypertrophy.  Moderate global left ventricular systolic dysfunction.  

Ejection fraction is visually estimated at 40 %.  Tissue Doppler/Mitral 

Doppler indices are consistent with impaired relaxation (Stage I 

diastolic dysfunction).

Severe hypokinesis of the mid to distal septum and entire apex. Mild 

hypokinesis of the anterior and inferior walls.

Mild mitral valve regurgitation.

Estimated peak PA systolic pressure 22 mmHg based on RA pressure of 3 

mmHg.

 

Electronically Signed By:

Tony Roberson

17-Jul-2017 10:36:46  -0700

 

Patient Name: JOSELUIS BHARDWAJ

MRN: [id number]

Study Date: 16-Jul-2017

 

[id number]

## 2017-07-18 VITALS — SYSTOLIC BLOOD PRESSURE: 121 MMHG | DIASTOLIC BLOOD PRESSURE: 67 MMHG | RESPIRATION RATE: 19 BRPM

## 2017-07-18 VITALS — DIASTOLIC BLOOD PRESSURE: 68 MMHG | SYSTOLIC BLOOD PRESSURE: 110 MMHG | RESPIRATION RATE: 16 BRPM

## 2017-07-18 VITALS — HEART RATE: 69 BPM

## 2017-07-18 VITALS — HEART RATE: 65 BPM

## 2017-07-18 VITALS — HEART RATE: 73 BPM

## 2017-07-18 VITALS — DIASTOLIC BLOOD PRESSURE: 76 MMHG | SYSTOLIC BLOOD PRESSURE: 123 MMHG | RESPIRATION RATE: 16 BRPM

## 2017-07-18 VITALS — HEART RATE: 62 BPM

## 2017-07-18 LAB
ADD SCAN DIFF: NO
ANION GAP SERPL CALC-SCNC: 16 MMOL/L (ref 8–16)
BASOPHILS # BLD AUTO: 0 10^3/UL (ref 0–0.1)
BASOPHILS NFR BLD: 0.6 % (ref 0–2)
BUN SERPL-MCNC: 14 MG/DL (ref 7–20)
CALCIUM SERPL-MCNC: 8.8 MG/DL (ref 8.4–10.2)
CHLORIDE SERPL-SCNC: 102 MMOL/L (ref 97–110)
CO2 SERPL-SCNC: 28 MMOL/L (ref 21–31)
CREAT SERPL-MCNC: 0.85 MG/DL (ref 0.61–1.24)
EOSINOPHIL # BLD: 0.2 10^3/UL (ref 0–0.5)
EOSINOPHIL NFR BLD: 3.1 % (ref 0–7)
ERYTHROCYTE [DISTWIDTH] IN BLOOD BY AUTOMATED COUNT: 12.2 % (ref 11.5–14.5)
GLUCOSE SERPL-MCNC: 92 MG/DL (ref 70–220)
HCT VFR BLD CALC: 39.3 % (ref 42–52)
HGB BLD-MCNC: 12.9 G/DL (ref 14–18)
LYMPHOCYTES # BLD AUTO: 1.7 10^3/UL (ref 0.8–2.9)
LYMPHOCYTES NFR BLD AUTO: 26.7 % (ref 15–51)
MAGNESIUM SERPL-MCNC: 1.9 MG/DL (ref 1.7–2.5)
MCH RBC QN AUTO: 30.4 PG (ref 29–33)
MCHC RBC AUTO-ENTMCNC: 32.8 G/DL (ref 32–37)
MCV RBC AUTO: 92.7 FL (ref 82–101)
MONOCYTES # BLD: 0.4 10^3/UL (ref 0.3–0.9)
MONOCYTES NFR BLD: 6.7 % (ref 0–11)
NEUTROPHILS # BLD: 4 10^3/UL (ref 1.6–7.5)
NEUTROPHILS NFR BLD AUTO: 62.7 % (ref 39–77)
NRBC # BLD MANUAL: 0 10^3/UL (ref 0–0)
NRBC BLD QL: 0 /100WBC (ref 0–0)
PHOSPHATE SERPL-MCNC: 3.6 MG/DL (ref 2.5–4.9)
PLATELET # BLD: 168 10^3/UL (ref 140–415)
PMV BLD AUTO: 12.3 FL (ref 7.4–10.4)
POTASSIUM SERPL-SCNC: 4.4 MMOL/L (ref 3.5–5.1)
RBC # BLD AUTO: 4.24 10^6/UL (ref 4.7–6.1)
SODIUM SERPL-SCNC: 142 MMOL/L (ref 135–144)
WBC # BLD AUTO: 6.4 10^3/UL (ref 4.8–10.8)

## 2017-07-18 RX ADMIN — ASPIRIN 81 MG SCH MG: 81 TABLET ORAL at 09:16

## 2017-07-18 RX ADMIN — PANTOPRAZOLE SODIUM SCH MG: 40 INJECTION, POWDER, FOR SOLUTION INTRAVENOUS at 05:42

## 2017-07-18 RX ADMIN — INSULIN GLARGINE SCH UNIT: 100 INJECTION, SOLUTION SUBCUTANEOUS at 08:31

## 2017-07-18 NOTE — DS
Date/Time of Note


Date/Time of Note


DATE: 7/18/17 


TIME: 15:55





Discharge Summary


Admission/Discharge Info


Admit Date/Time


Jul 15, 2017 at 19:38


Discharge Date/Time


Jul 18, 2017 at 15:00


Discharge Diagnosis





#1 NSTEMI status post PCI with stent placement in the ostial


DC with aspirin, Plavix, Lipitor, beta-blocker and ACE





#2  Ascending aortic aneurysm


Cardiothoracic surgery consultation appreciated, plan is for repeat CT in 6 

months





#3 diabetes mellitus: A1c severely elevated 10.9 patient not on any diabetic 

medications but has been told about having diabetes in the past


DC with metformin and glipizide and patient is to follow-up with a diabetes 

specialist as an outpatient





#4  Acute kidney injury-resolved





#5 Hypertension: BP stable, DC with Coreg and lisinopril





#6 Ischemic cardiomyopathy: EF 40% with LAD territory WMA. Expect to get better 

post PCI. Compensated by exam


Patient Condition:  Good


Hospital Course


Patient is 67-year-old male with a history of diabetes and medical hypertension

, patient is not on any diabetic medications.  Patient presented with chest 

pain and non-STEMI patient was seen by cardiology, PCI was done with stent 

placement in the ostial.  Patient did have ischemic cardiomyopathy with EF of 40

%, expect to get better post PCI and patient was compensated.  Patient's A1c 

was checked and was elevated at 10.9.  Patient was told that he needs follow-up 

with diabetes specialist and he stated that he early has plans to do so for his 

diabetes.  Patient was told  about the importance of compliance with his 

cardiac medications he stated that he understood.  Patient was clear for DC per 

cardiology.  On the day of discharge patient vitals, labs and physical exam are 

stable, he had no acute complaints and questions are answered.


Home Meds


Active Scripts


Glipizide* (Glipizide*) 5 Mg Tablet, 5 MG PO AC BREAKFAST, #60 TAB


   Prov:DC LIRIANOALMAS         7/18/17


Metformin Hcl* (Metformin Hcl*) 500 Mg Tablet, 500 MG PO WITH BREAKFAST DINNE, #

60 TAB 1 Refill


   Prov:DC LIRIANOALMAS         7/18/17


Nitroglycerin* (Nitroglycerin* SL) 0.4 Mg Tab.subl, 0.4 MG SL Q5MIN Y for CHEST 

PAIN, #90 BOTTLE


   Prov:DC LIRIANOALMAS         7/18/17


Aspirin (Aspirin) 81 Mg Chew, 81 MG PO DAILY, #90 TAB


   Prov:BRUCE LIRIANO         7/18/17


Lisinopril* (Lisinopril*) 5 Mg Tablet, 5 MG PO DAILY, #60 TAB


   Prov:BRUCE LIRIANO         7/18/17


Carvedilol* (Carvedilol*) 6.25 Mg Tablet, 6.25 MG PO BID, #60 TAB 1 Refill


   Prov:BRUCE LIRIANO         7/18/17


Atorvastatin* (Atorvastatin*) 40 Mg Tablet, 40 MG PO HS, #60 TAB


   Prov:BRUCE LIRIANO         7/18/17


Clopidogrel Bisulfate (Clopidogrel) 75 Mg Tablet, 75 MG PO DAILY, #90 TAB


   Prov:BRUCE LIRIANO         7/18/17


Follow-up Plan


Follow up with PCP, cardiology and diabetes specialist


Primary Care Provider


Care Physician No Primary


Time spent on discharge:   > 30 minutes











BRUCE LIRIANO Jul 18, 2017 16:03

## 2017-07-18 NOTE — PDOCDIS
Discharge Instructions


CONDITION


Patient Condition:  Good





HOME CARE INSTRUCTIONS:


Special Diet:  carb controlled





ACTIVITY:








Activity Restrictions:   No Restrictions











FOLLOW UP/APPOINTMENTS


Follow-up Plan


F/U WITH YOUR PCP IN 1-2 WEEKS AND F/U WITH A DIABETES SPECIALIST











BRUCE LIRIANO Jul 18, 2017 11:58

## 2017-07-18 NOTE — RADRPT
Vent Rate: 60 bpm

RR Interval: 0 msec

AZ Interval: 160 msec

QRS Duration: 78 msec

QT Interval: 404 msec

QTC Interval: 404 msec

P-R-T Axis: 44 - 30 - 94 degrees

 

 Normal sinus rhythm

 Nonspecific T wave abnormality

 Abnormal ECG

 

Electronically Signed By: Jairon Alejandra

[id number]

## 2017-07-18 NOTE — CONS
Date/Time of Note


Date/Time of Note


DATE: 7/18/17 


TIME: 11:27





Assessment/Plan


Assessment/Plan


Chief Complaint/Hosp Course


NSTEMI: Trop up to 2. CTA negative for dissection. Cath with ostial LAD lesion s

/p successful PCI. No residual lesions. Doing well. No symptoms. 


Ischemic cardiomyopathy: EF 40% with LAD territory WMA. Expect to get better 

post PCI. Compensated by exam 


DM


HTN





-ok for d/c





-ASA 81mg


-plavix 75mg daily for one year, probably longer


-lipitor 40mg


-coreg 6.25mg BID


-start lisinopril 5mg


Problems:  





Consultation Date/Type/Reason


Admit Date/Time


Jul 15, 2017 at 19:38


Initial Consult Date


7/16/17


Type of Consultation:  Cardiology


Referring Provider:  SEGUN MAIER





24 HR Interval Summary


Free Text/Dictation


No o/n events. Feels well. No chest pain.





Exam/Review of Systems


Vital Signs


Vitals





 Vital Signs








  Date Time  Temp Pulse Resp B/P Pulse Ox O2 Delivery O2 Flow Rate FiO2


 


7/18/17 08:24  69      


 


7/18/17 07:20 98.0  16 123/76 99   


 


7/17/17 14:29       3.0 


 


7/17/17 13:29      Nasal Cannula  














 Intake and Output   


 


 7/17/17 7/17/17 7/18/17





 15:00 23:00 07:00


 


Intake Total 0 ml 640 ml 400 ml


 


Output Total 350 ml  


 


Balance -350 ml 640 ml 400 ml











Exam


Constitutional:  alert, oriented


Psych:  nl mood/affect, no complaints


Head:  atraumatic, normocephalic


Neck:  No jvd


Respiratory:  clear to auscultation, 


   No crackles/rales


Cardiovascular:  regular rate and rhythm, 


   No edema


Gastrointestinal:  non-tender, soft


Neurological:  nl mental status, nl speech





Results


Result Diagram:  


7/18/17 0618                                                                   

             7/18/17 0618





Results 24 hrs





Laboratory Tests








Test


  7/17/17


15:34 7/17/17


21:17 7/18/17


06:18 7/18/17


08:16


 


Bedside Glucose 85   95    110  


 


White Blood Count   6.4   


 


Red Blood Count   4.24  L 


 


Hemoglobin   12.9  L 


 


Hematocrit   39.3  L 


 


Mean Corpuscular Volume   92.7   


 


Mean Corpuscular Hemoglobin   30.4   


 


Mean Corpuscular Hemoglobin


Concent 


  


  32.8  


  


 


 


Red Cell Distribution Width   12.2   


 


Platelet Count   168   


 


Mean Platelet Volume   12.3  H 


 


Neutrophils %   62.7   


 


Lymphocytes %   26.7   


 


Monocytes %   6.7   


 


Eosinophils %   3.1   


 


Basophils %   0.6   


 


Nucleated Red Blood Cells %   0.0   


 


Neutrophils #   4.0   


 


Lymphocytes #   1.7   


 


Monocytes #   0.4   


 


Eosinophils #   0.2   


 


Basophils #   0.0   


 


Nucleated Red Blood Cells #   0.0   


 


Sodium Level   142   


 


Potassium Level   4.4   


 


Chloride Level   102   


 


Carbon Dioxide Level   28   


 


Anion Gap   16   


 


Blood Urea Nitrogen   14   


 


Creatinine   0.85   


 


Glucose Level   92  # 


 


Calcium Level   8.8   


 


Phosphorus Level   3.6   


 


Magnesium Level   1.9   











Medications


Medications





 Current Medications


Ondansetron HCl (Zofran Inj) 4 mg Q6H  PRN IV NAUSEA AND/OR VOMITING;  Start 7/ 16/17 at 00:30


Aspirin (Aspirin) 81 mg DAILY PO  Last administered on 7/18/17at 09:16; Admin 

Dose 81 MG;  Start 7/16/17 at 09:00


Acetaminophen (Tylenol Tab) 650 mg Q6H  PRN PO PAIN LEVEL 1-3 OR FEVER;  Start 7 /16/17 at 00:30


Morphine Sulfate (morphine) 2 mg Q4H  PRN IV PAIN LEVEL 7-10;  Start 7/16/17 at 

00:30


Pantoprazole (Protonix Iv) 40 mg DAILY@06 IV  Last administered on 7/18/17at 05:

42; Admin Dose 40 MG;  Start 7/16/17 at 06:00


Diagnostic Test (Pha) (Accu-Chek) 1 ea 02 XX ;  Start 7/17/17 at 02:00


Miscellaneous Information 1 ea NOTE XX ;  Start 7/16/17 at 07:00


Glucose (Glutose) 15 gm Q15M  PRN PO DECREASED GLUCOSE;  Start 7/16/17 at 07:00


Glucose (Glutose) 22.5 gm Q15M  PRN PO DECREASED GLUCOSE;  Start 7/16/17 at 07:

00


Dextrose (D50w Syringe) 25 ml Q15M  PRN IV DECREASED GLUCOSE;  Start 7/16/17 at 

07:00


Dextrose (D50w Syringe) 50 ml Q15M  PRN IV DECREASED GLUCOSE;  Start 7/16/17 at 

07:00


Glucagon (Glucagen) 1 mg Q15M  PRN IM DECREASED GLUCOSE;  Start 7/16/17 at 07:00


Glucose (Glutose) 15 gm Q15M  PRN BUCCAL DECREASED GLUCOSE;  Start 7/16/17 at 07

:00


Atorvastatin Calcium (Lipitor) 40 mg HS PO  Last administered on 7/17/17at 21:30

; Admin Dose 40 MG;  Start 7/16/17 at 21:00


Insulin Glargine (Lantus) 13 unit DAILY@08 SC  Last administered on 7/18/17at 08

:31; Admin Dose 13 UNIT;  Start 7/16/17 at 11:00


Morphine Sulfate (morphine) 2 mg Q2H  PRN IV FOR NON CARDIAC PAIN (4-10);  

Start 7/17/17 at 12:00


Ondansetron HCl (Zofran Inj) 4 mg Q4H  PRN IV NAUSEA AND/OR VOMITING;  Start 7/ 17/17 at 12:00


Clopidogrel Bisulfate (plaVIX) 75 mg DAILY PO  Last administered on 7/18/17at 09

:18; Admin Dose 75 MG;  Start 7/18/17 at 09:00


Carvedilol (Coreg) 6.25 mg BID PO  Last administered on 7/18/17at 09:17; Admin 

Dose 6.25 MG;  Start 7/17/17 at 21:00


Lisinopril (Zestril) 5 mg DAILY PO  Last administered on 7/18/17at 09:18; Admin 

Dose 5 MG;  Start 7/18/17 at 09:00











SONIYA FERGUSON Jul 18, 2017 11:29

## 2019-03-28 ENCOUNTER — HOSPITAL ENCOUNTER (INPATIENT)
Dept: HOSPITAL 10 - E/R | Age: 69
LOS: 4 days | Discharge: HOME | DRG: 871 | End: 2019-04-01
Attending: INTERNAL MEDICINE | Admitting: INTERNAL MEDICINE
Payer: MEDICAID

## 2019-03-28 ENCOUNTER — HOSPITAL ENCOUNTER (INPATIENT)
Dept: HOSPITAL 91 - E/R | Age: 69
LOS: 4 days | Discharge: HOME | DRG: 871 | End: 2019-04-01
Payer: MEDICAID

## 2019-03-28 VITALS
WEIGHT: 204.15 LBS | BODY MASS INDEX: 32.04 KG/M2 | BODY MASS INDEX: 32.04 KG/M2 | WEIGHT: 204.15 LBS | HEIGHT: 67 IN | HEIGHT: 67 IN

## 2019-03-28 DIAGNOSIS — E86.0: ICD-10-CM

## 2019-03-28 DIAGNOSIS — Z95.5: ICD-10-CM

## 2019-03-28 DIAGNOSIS — E11.9: ICD-10-CM

## 2019-03-28 DIAGNOSIS — A41.9: Primary | ICD-10-CM

## 2019-03-28 DIAGNOSIS — J18.9: ICD-10-CM

## 2019-03-28 DIAGNOSIS — I50.20: ICD-10-CM

## 2019-03-28 DIAGNOSIS — I11.0: ICD-10-CM

## 2019-03-28 DIAGNOSIS — I71.4: ICD-10-CM

## 2019-03-28 DIAGNOSIS — I25.5: ICD-10-CM

## 2019-03-28 LAB
ADD MAN DIFF?: NO
ADD UMIC: YES
ALANINE AMINOTRANSFERASE: 12 IU/L (ref 13–69)
ALBUMIN/GLOBULIN RATIO: 1.15
ALBUMIN: 3.8 G/DL (ref 3.3–4.9)
ALKALINE PHOSPHATASE: 76 IU/L (ref 42–121)
ANION GAP: 15 (ref 5–13)
ASPARTATE AMINO TRANSFERASE: 11 IU/L (ref 15–46)
BASOPHIL #: 0 10^3/UL (ref 0–0.1)
BASOPHILS %: 0.3 % (ref 0–2)
BILIRUBIN,DIRECT: 0 MG/DL (ref 0–0.2)
BILIRUBIN,TOTAL: 0.6 MG/DL (ref 0.2–1.3)
BLOOD UREA NITROGEN: 22 MG/DL (ref 7–20)
CALCIUM: 9.1 MG/DL (ref 8.4–10.2)
CARBON DIOXIDE: 24 MMOL/L (ref 21–31)
CHLORIDE: 97 MMOL/L (ref 97–110)
CREATININE: 0.95 MG/DL (ref 0.61–1.24)
EOSINOPHILS #: 0 10^3/UL (ref 0–0.5)
EOSINOPHILS %: 0.2 % (ref 0–7)
GLOBULIN: 3.3 G/DL (ref 1.3–3.2)
GLUCOSE: 216 MG/DL (ref 70–220)
HEMATOCRIT: 36 % (ref 42–52)
HEMOGLOBIN: 11.9 G/DL (ref 14–18)
IMMATURE GRANS #M: 0.2 10^3/UL (ref 0–0.03)
IMMATURE GRANS % (M): 1.4 % (ref 0–0.43)
INR: 1.08
LACTIC ACID: 1.1 MMOL/L (ref 0.5–2)
LYMPHOCYTES #: 1.3 10^3/UL (ref 0.8–2.9)
LYMPHOCYTES %: 9.5 % (ref 15–51)
MEAN CORPUSCULAR HEMOGLOBIN: 29.9 PG (ref 29–33)
MEAN CORPUSCULAR HGB CONC: 33.1 G/DL (ref 32–37)
MEAN CORPUSCULAR VOLUME: 90.5 FL (ref 82–101)
MEAN PLATELET VOLUME: 11.1 FL (ref 7.4–10.4)
MONOCYTE #: 1.5 10^3/UL (ref 0.3–0.9)
MONOCYTES %: 10.8 % (ref 0–11)
NEUTROPHIL #: 10.7 10^3/UL (ref 1.6–7.5)
NEUTROPHILS %: 77.8 % (ref 39–77)
NUCLEATED RED BLOOD CELLS #: 0 10^3/UL (ref 0–0)
NUCLEATED RED BLOOD CELLS%: 0 /100WBC (ref 0–0)
PARTIAL THROMBOPLASTIN TIME: 31.4 SEC (ref 23–35)
PLATELET COUNT: 262 10^3/UL (ref 140–415)
POTASSIUM: 4.3 MMOL/L (ref 3.5–5.1)
PROTIME: 14.1 SEC (ref 11.9–14.9)
PT RATIO: 1.1
RED BLOOD COUNT: 3.98 10^6/UL (ref 4.7–6.1)
RED CELL DISTRIBUTION WIDTH: 12 % (ref 11.5–14.5)
SODIUM: 136 MMOL/L (ref 135–144)
TOTAL PROTEIN: 7.1 G/DL (ref 6.1–8.1)
TROPONIN-I: < 0.012 NG/ML (ref 0–0.12)
UR ASCORBIC ACID: NEGATIVE MG/DL
UR BILIRUBIN (DIP): NEGATIVE MG/DL
UR BLOOD (DIP): NEGATIVE MG/DL
UR CLARITY: CLEAR
UR COLOR: YELLOW
UR GLUCOSE (DIP): NEGATIVE MG/DL
UR KETONES (DIP): (no result) MG/DL
UR LEUKOCYTE ESTERASE (DIP): NEGATIVE LEU/UL
UR MUCUS: (no result) /HPF
UR NITRITE (DIP): NEGATIVE MG/DL
UR PH (DIP): 5 (ref 5–9)
UR RBC: 1 /HPF (ref 0–5)
UR SPECIFIC GRAVITY (DIP): 1.02 (ref 1–1.03)
UR TOTAL PROTEIN (DIP): (no result) MG/DL
UR UROBILINOGEN (DIP): (no result) MG/DL
UR WBC: 1 /HPF (ref 0–5)
URINE KETONES (DIP) POC: (no result)
URINE PH (DIP) POC: 5 (ref 5–8.5)
URINE TOTAL PROTEIN POC: (no result)
WHITE BLOOD COUNT: 13.8 10^3/UL (ref 4.8–10.8)

## 2019-03-28 PROCEDURE — 80053 COMPREHEN METABOLIC PANEL: CPT

## 2019-03-28 PROCEDURE — 83605 ASSAY OF LACTIC ACID: CPT

## 2019-03-28 PROCEDURE — 87086 URINE CULTURE/COLONY COUNT: CPT

## 2019-03-28 PROCEDURE — 94664 DEMO&/EVAL PT USE INHALER: CPT

## 2019-03-28 PROCEDURE — 80061 LIPID PANEL: CPT

## 2019-03-28 PROCEDURE — 85025 COMPLETE CBC W/AUTO DIFF WBC: CPT

## 2019-03-28 PROCEDURE — 36415 COLL VENOUS BLD VENIPUNCTURE: CPT

## 2019-03-28 PROCEDURE — 71045 X-RAY EXAM CHEST 1 VIEW: CPT

## 2019-03-28 PROCEDURE — 83540 ASSAY OF IRON: CPT

## 2019-03-28 PROCEDURE — 84100 ASSAY OF PHOSPHORUS: CPT

## 2019-03-28 PROCEDURE — 84484 ASSAY OF TROPONIN QUANT: CPT

## 2019-03-28 PROCEDURE — 83735 ASSAY OF MAGNESIUM: CPT

## 2019-03-28 PROCEDURE — 96375 TX/PRO/DX INJ NEW DRUG ADDON: CPT

## 2019-03-28 PROCEDURE — 87070 CULTURE OTHR SPECIMN AEROBIC: CPT

## 2019-03-28 PROCEDURE — 81003 URINALYSIS AUTO W/O SCOPE: CPT

## 2019-03-28 PROCEDURE — 80048 BASIC METABOLIC PNL TOTAL CA: CPT

## 2019-03-28 PROCEDURE — 93005 ELECTROCARDIOGRAM TRACING: CPT

## 2019-03-28 PROCEDURE — 85610 PROTHROMBIN TIME: CPT

## 2019-03-28 PROCEDURE — 87400 INFLUENZA A/B EACH AG IA: CPT

## 2019-03-28 PROCEDURE — 96374 THER/PROPH/DIAG INJ IV PUSH: CPT

## 2019-03-28 PROCEDURE — 87040 BLOOD CULTURE FOR BACTERIA: CPT

## 2019-03-28 PROCEDURE — 85730 THROMBOPLASTIN TIME PARTIAL: CPT

## 2019-03-28 PROCEDURE — 94640 AIRWAY INHALATION TREATMENT: CPT

## 2019-03-28 PROCEDURE — 84443 ASSAY THYROID STIM HORMONE: CPT

## 2019-03-28 PROCEDURE — 83036 HEMOGLOBIN GLYCOSYLATED A1C: CPT

## 2019-03-28 PROCEDURE — 82962 GLUCOSE BLOOD TEST: CPT

## 2019-03-28 PROCEDURE — 86803 HEPATITIS C AB TEST: CPT

## 2019-03-28 PROCEDURE — 81001 URINALYSIS AUTO W/SCOPE: CPT

## 2019-03-28 PROCEDURE — 99285 EMERGENCY DEPT VISIT HI MDM: CPT

## 2019-03-28 RX ADMIN — THIAMINE HYDROCHLORIDE 1 ML: 100 INJECTION, SOLUTION INTRAMUSCULAR; INTRAVENOUS at 22:39

## 2019-03-28 RX ADMIN — ACETAMINOPHEN 1 MG: 325 TABLET, FILM COATED ORAL at 22:28

## 2019-03-28 RX ADMIN — CEFTRIAXONE 1 MLS/HR: 1 INJECTION, SOLUTION INTRAVENOUS at 22:20

## 2019-03-28 NOTE — ERD
ER Documentation


Chief Complaint


Chief Complaint





fever/cough/sob x 5 days





HPI


The patient is-year-old male, presenting to the ER because of fever, congestion,


cough, dyspnea from the cough for the last 4-5 days.  He noticed fever today, 


denies nasal congestion, denies facial pain, neck pain, chest pain, abdominal 


pain, vomiting, dysuria.  He does not smoke





Past medical history: Diabetes mellitus, hypertension, dyslipidemia, ascending 


aortic aneurysm, ischemic cardiomyopathy with low EF of 40%, CAD


Past surgical history: Stent PCI





ROS


All systems reviewed and are negative except as per history of present illness.





Medications


Home Meds


Active Scripts


Glipizide* (Glipizide*) 5 Mg Tablet, 5 MG PO AC BREAKFAST, #60 TAB


   Prov:BRUCE LIRIANO         17


Metformin Hcl* (Metformin Hcl*) 500 Mg Tablet, 500 MG PO WITH BREAKFAST DINNE, 


#60 TAB 1 Refill


   Prov:BRUCE LIRIANO         17


Nitroglycerin* (Nitroglycerin* SL) 0.4 Mg Tab.subl, 0.4 MG SL Q5MIN PRN for 


CHEST PAIN, #90 BOTTLE


   Prov:BRUCE LIRIANO         17


Aspirin (Aspirin) 81 Mg Chew, 81 MG PO DAILY, #90 TAB


   Prov:BRUCE LIRIANO         17


Lisinopril* (Lisinopril*) 5 Mg Tablet, 5 MG PO DAILY, #60 TAB


   Prov:BRUCE LIRIANO         17


Carvedilol* (Carvedilol*) 6.25 Mg Tablet, 6.25 MG PO BID, #60 TAB 1 Refill


   Prov:BRUCE LIRIANO         17


Atorvastatin* (Atorvastatin*) 40 Mg Tablet, 40 MG PO HS, #60 TAB


   Prov:BRUCE LIRIANO         17


Clopidogrel Bisulfate (Clopidogrel) 75 Mg Tablet, 75 MG PO DAILY, #90 TAB


   Prov:BRUCE LIRIANO         17





Allergies


Allergies:  


Coded Allergies:  


     No Known Allergy (Unverified , 3/28/19)





PMhx/Soc


History of Surgery:  No


Anesthesia Reaction:  No


Hx Neurological Disorder:  No


Hx Respiratory Disorders:  No


Hx Cardiac Disorders:  Yes


Hx Psychiatric Problems:  No


Hx Miscellaneous Medical Probl:  No


Hx Alcohol Use:  No


Hx Substance Use:  No


Hx Tobacco Use:  No





Physical Exam


Vitals





Vital Signs


  Date      Temp   Pulse  Resp  B/P (MAP)   Pulse Ox  O2         O2 Flow    FiO2


Time                                                  Delivery   Rate


   3/28/19   99.5     81    19      121/66        96  Nasal            2.0


     23:23                            (84)            Cannula


   3/28/19                                            Nasal              2


     22:30                                            Cannula


   3/28/19  101.5


     22:28


   3/28/19  101.5     89    18      135/64        93


     21:25                            (87)





Physical Exam


 Const:      No acute distress.


 Head:        Atraumatic.


 Eyes:       Normal Conjunctiva.


 ENT:         Normal External Ears, Nose and Mouth.  Bilateral tympanic 


membranes and  oropharynx are within normal limit


 Neck:        Full range of motion.  No meningismus.


 Resp:         Clear to auscultation bilaterally.


 Cardio:       Regular rate and rhythm.


 Abd:         Soft,  non distended, normal bowel sounds, non tender.


 Skin:         No petechiae or rashes.


 Back:        No midline or flank tenderness.


 Ext:          No cyanosis, or edema.


 Neur:        Awake and alert. No focal deficit


 Psych:        Normal Mood and Affect.


Result Diagram:  


3/28/19 2218                                                                    


           3/28/19 2218





Results 24 hrs





Laboratory Tests


Test
               3/28/19
22:18    3/28/19
22:19  3/28/19
22:28  3/28/19
23:56


White Blood Count   13.8 10^3/ul


Red Blood Count     3.98 10^6/ul


Hemoglobin             11.9 g/dl


Hematocrit                36.0 %


Mean Corpuscular         90.5 fl


Volume


Mean Corpuscular         29.9 pg


Hemoglobin


Mean Corpuscular      33.1 g/dl 
  
                
              



Hemoglobin
Concen


t


Red Cell                  12.0 %


Distribution


Width


Platelet Count       262 10^3/UL


Mean Platelet            11.1 fl


Volume


Immature                 1.400 %


Granulocytes %


Neutrophils %             77.8 %


Lymphocytes %              9.5 %


Monocytes %               10.8 %


Eosinophils %              0.2 %


Basophils %                0.3 %


Nucleated Red        0.0 /100WBC


Blood Cells %


Immature           0.200 10^3/ul


Granulocytes #


Neutrophils #       10.7 10^3/ul


Lymphocytes #        1.3 10^3/ul


Monocytes #          1.5 10^3/ul


Eosinophils #        0.0 10^3/ul


Basophils #          0.0 10^3/ul


Nucleated Red        0.0 10^3/ul


Blood Cells #


Prothrombin Time        14.1 Sec


Prothrombin Time             1.1


Ratio


INR International          1.08 
  
                
              



Normalized
Ratio


Activated              31.4 Sec 
  
                
              



Partial
Thrombopl


ast Time


Sodium Level          136 mmol/L


Potassium Level       4.3 mmol/L


Chloride Level         97 mmol/L


Carbon Dioxide         24 mmol/L


Level


Anion Gap                     15


Blood Urea              22 mg/dl


Nitrogen


Creatinine            0.95 mg/dl


Est Glomerular     > 60 mL/min 
   
                
              



Filtrat


Rate
mL/min


Glucose Level          216 mg/dl


Lactic Acid Level     1.1 mmol/L                                     1.2 mmol/L


Calcium Level          9.1 mg/dl


Total Bilirubin        0.6 mg/dl


Direct Bilirubin      0.00 mg/dl


Indirect               0.6 mg/dl


Bilirubin


Aspartate Amino         11 IU/L 
  
                
              



Transf
(AST/SGOT)


Alanine                 12 IU/L 
  
                
              



Aminotransferase



(ALT/SGPT)


Alkaline                 76 IU/L


Phosphatase


Troponin I         < 0.012 ng/ml


Total Protein           7.1 g/dl


Albumin                 3.8 g/dl


Globulin               3.30 g/dl


Albumin/Globulin            1.15


Ratio


Urine Color                        YELLOW


Urine Clarity                      CLEAR


Urine pH                                      5.0


Urine Specific                              1.025


Gravity


Urine Ketones                            1+ mg/dL


Urine Nitrite                      NEGATIVE mg/dL


Urine Bilirubin                    NEGATIVE mg/dL


Urine                                    1+ mg/dL


Urobilinogen


Urine Leukocyte    
               NEGATIVE
Damaris/ul  
              



Esterase



Urine Microscopic                          1 /HPF


RBC


Urine Microscopic                          1 /HPF


WBC


Urine Mucus                        MODERATE /HPF


Urine Hemoglobin                   NEGATIVE mg/dL


Urine Glucose                      NEGATIVE mg/dL


Urine Total                              1+ mg/dl


Protein


Bedside Urine pH                                             5.0


(LAB)


Bedside Urine                                                 2+


Protein (LAB)


Bedside Urine                                       Negative


Glucose (UA)


Bedside Urine                                                 2+


Ketones (LAB)


Bedside Urine                                       Trace-lysed


Blood


Bedside Urine                                       Negative


Nitrite (LAB)


Bedside Urine      
               
                Negative 
     



Leukocyte
Esteras


e (L





Current Medications


 Medications
   Dose
          Sig/Lyle
       Start Time
   Status  Last


 (Trade)       Ordered        Route
 PRN     Stop Time              Admin
Dose


                              Reason                                Admin


 Sodium         2,790 ml       BOLUS OVER 2   3/28/19       Cancel   



Chloride
                     HOURS STAT
    21:56



(NS)                          IV*
           3/28/19 21:57


                650 mg         ONCE  STAT
    3/28/19       DC           3/28/19


Acetaminophen                 PO
            21:56
                       22:28




  (Tylenol                                  3/28/19 21:57


Tab)


 Ceftriaxone    50 ml @ 
      ONCE  STAT
    3/28/19       DC           3/28/19


Sodium         100 mls/hr     IVPB
          21:56
                       22:20



                                             3/28/19 22:25


 Sodium         1,980 ml       BOLUS OVER 2   3/28/19       DC           3/28/19


Chloride
                     HOURS STAT
    22:22
                       22:39



(NS)                          IV*
           3/28/19 22:24


 Azithromycin   250 ml @ 
     ONCE  ONCE
    3/29/19                    3/29/19


               250 mls/hr     IVPB
          00:30
                       00:18



                                             3/29/19 01:29








Procedures/MDM


                          [place] [id number]


                        Radiology Main Line: [phone]





                            DIAGNOSTIC IMAGING REPORT





Patient: JOSELUIS BHARDWAJ   : 1950   Age: 68  Sex: M                      


 


       MR #:    [id number]   Acct #:   [id number]    DOS: 19 [id number]


Ordering MD: HARDEEP SHELL MD   Location:  E/[facility]   Room/Bed:                      


                     


                                        


PROCEDURE:   XR Chest. 


 


CLINICAL INDICATION:  Sepsis


 


TECHNIQUE:   Frontal chest x-ray was obtained. 


 


COMPARISON:   None. 


 


FINDINGS:


The heart is not enlarged. Mediastinum is not widened. No hilar masses seen. 


Interstitial infiltrate is seen in the retrocardiac left lower lobe.. There is 


no effusion or pneumothorax. The osseous structures appear normal.


 


IMPRESSION:


Left lower lobe confluent interstitial infiltrate. Question pneumonia.


_____________________________________________ 


.Drew Hudson MD, MD           Date    Time 


Electronically viewed and signed by .Drew Hudson MD, MD on 2019 


22:36 


 


D:  2019 22:36  T:  2019 22:36


.A/





CC: HARDEEP SHELL MD





[phone]





EKG:                           Read by emergency physician


Rate/Rhythm:          Normal Sinus Rhythm 87   beats/min


QRS, ST, T-waves:    No ST elevation, no T inversion


Impression:          Normal      EKG





MEDICAL MAKING DECISION: The patient is a 68-year-old male, presenting with 


acute pneumonia, acute hypoxemia.  He was treated with ideal body weight IV 


fluid for clinical dehydration, acetaminophen for fever, Rocephin IV and 


Zithromax IV for acute pneumonia with good response, he improved well with 2 L 


nasal cannula





The differential diagnoses considered include but are not limited to asthma, 


COPD, pneumonia, pulmonary embolus, pleural effusion, congestive heart failure.





Departure


Diagnosis:  


   Primary Impression:  


   PNA (pneumonia)


   Additional Impressions:  


   Hypoxemia


   Anemia


Condition:  Stable


Comments


I discussed the findings with the patient. I discussed the patient with the 


hospitalist Dr Treadwell 12:15 am  who was made aware of the lab, the treatment, the 


patient condition. The patient is admitted to [facility]





Disclaimer: Inadvertent spelling and grammatical errors are likely due to 


EHR/dictation software use and do not reflect on the overall quality of patient 


care. Also, please note that the electronic time recorded on this note does not 


necessarily reflect the actual time of the patient encounter.











HENRY FRANCIS MD              Mar 28, 2019 22:16

## 2019-03-29 VITALS — HEART RATE: 82 BPM

## 2019-03-29 VITALS — HEART RATE: 84 BPM | SYSTOLIC BLOOD PRESSURE: 129 MMHG | RESPIRATION RATE: 18 BRPM | DIASTOLIC BLOOD PRESSURE: 63 MMHG

## 2019-03-29 VITALS — DIASTOLIC BLOOD PRESSURE: 68 MMHG | RESPIRATION RATE: 19 BRPM | HEART RATE: 82 BPM | SYSTOLIC BLOOD PRESSURE: 133 MMHG

## 2019-03-29 VITALS — RESPIRATION RATE: 18 BRPM | SYSTOLIC BLOOD PRESSURE: 134 MMHG | HEART RATE: 79 BPM | DIASTOLIC BLOOD PRESSURE: 64 MMHG

## 2019-03-29 VITALS — HEART RATE: 76 BPM

## 2019-03-29 VITALS — RESPIRATION RATE: 28 BRPM | SYSTOLIC BLOOD PRESSURE: 128 MMHG | DIASTOLIC BLOOD PRESSURE: 70 MMHG | HEART RATE: 79 BPM

## 2019-03-29 VITALS — HEART RATE: 79 BPM | RESPIRATION RATE: 20 BRPM | SYSTOLIC BLOOD PRESSURE: 119 MMHG | DIASTOLIC BLOOD PRESSURE: 68 MMHG

## 2019-03-29 VITALS — HEART RATE: 80 BPM

## 2019-03-29 VITALS — DIASTOLIC BLOOD PRESSURE: 61 MMHG | RESPIRATION RATE: 18 BRPM | SYSTOLIC BLOOD PRESSURE: 121 MMHG | HEART RATE: 80 BPM

## 2019-03-29 VITALS — RESPIRATION RATE: 24 BRPM | HEART RATE: 83 BPM | SYSTOLIC BLOOD PRESSURE: 127 MMHG | DIASTOLIC BLOOD PRESSURE: 60 MMHG

## 2019-03-29 LAB
ADD MAN DIFF?: NO
ALANINE AMINOTRANSFERASE: 16 IU/L (ref 13–69)
ALBUMIN/GLOBULIN RATIO: 1.1
ALBUMIN: 3.3 G/DL (ref 3.3–4.9)
ALKALINE PHOSPHATASE: 67 IU/L (ref 42–121)
ANION GAP: 11 (ref 5–13)
ASPARTATE AMINO TRANSFERASE: 10 IU/L (ref 15–46)
BASOPHIL #: 0.1 10^3/UL (ref 0–0.1)
BASOPHILS %: 0.5 % (ref 0–2)
BILIRUBIN,DIRECT: 0 MG/DL (ref 0–0.2)
BILIRUBIN,TOTAL: 0.4 MG/DL (ref 0.2–1.3)
BLOOD UREA NITROGEN: 20 MG/DL (ref 7–20)
CALCIUM: 8.3 MG/DL (ref 8.4–10.2)
CARBON DIOXIDE: 25 MMOL/L (ref 21–31)
CHLORIDE: 101 MMOL/L (ref 97–110)
CHOL/HDL RATIO: 2.7 RATIO
CHOLESTEROL: 57 MG/DL (ref 100–200)
CREATININE: 0.84 MG/DL (ref 0.61–1.24)
EOSINOPHILS #: 0.1 10^3/UL (ref 0–0.5)
EOSINOPHILS %: 0.5 % (ref 0–7)
GLOBULIN: 3 G/DL (ref 1.3–3.2)
GLUCOSE: 254 MG/DL (ref 70–220)
HDL CHOLESTEROL: 21 MG/DL (ref 30–78)
HEMATOCRIT: 34 % (ref 42–52)
HEMOGLOBIN A1C: 9.2 % (ref 0–5.9)
HEMOGLOBIN: 11.1 G/DL (ref 14–18)
IMMATURE GRANS #M: 0.17 10^3/UL (ref 0–0.03)
IMMATURE GRANS % (M): 1.4 % (ref 0–0.43)
LACTIC ACID: 1.2 MMOL/L (ref 0.5–2)
LACTIC ACID: 1.2 MMOL/L (ref 0.5–2)
LDL CHOLESTEROL,CALCULATED: 25 MG/DL
LYMPHOCYTES #: 1.3 10^3/UL (ref 0.8–2.9)
LYMPHOCYTES %: 10.6 % (ref 15–51)
MAGNESIUM: 1.5 MG/DL (ref 1.7–2.5)
MEAN CORPUSCULAR HEMOGLOBIN: 29.9 PG (ref 29–33)
MEAN CORPUSCULAR HGB CONC: 32.6 G/DL (ref 32–37)
MEAN CORPUSCULAR VOLUME: 91.6 FL (ref 82–101)
MEAN PLATELET VOLUME: 11.2 FL (ref 7.4–10.4)
MONOCYTE #: 1.5 10^3/UL (ref 0.3–0.9)
MONOCYTES %: 12 % (ref 0–11)
NEUTROPHIL #: 9.4 10^3/UL (ref 1.6–7.5)
NEUTROPHILS %: 75 % (ref 39–77)
NUCLEATED RED BLOOD CELLS #: 0 10^3/UL (ref 0–0)
NUCLEATED RED BLOOD CELLS%: 0 /100WBC (ref 0–0)
PLATELET COUNT: 225 10^3/UL (ref 140–415)
POTASSIUM: 4.2 MMOL/L (ref 3.5–5.1)
RED BLOOD COUNT: 3.71 10^6/UL (ref 4.7–6.1)
RED CELL DISTRIBUTION WIDTH: 12.1 % (ref 11.5–14.5)
SODIUM: 137 MMOL/L (ref 135–144)
THYROID STIMULATING HORMONE: 2.31 MIU/L (ref 0.47–4.68)
TOTAL PROTEIN: 6.3 G/DL (ref 6.1–8.1)
TRIGLYCERIDES: 53 MG/DL (ref 0–149)
WHITE BLOOD COUNT: 12.5 10^3/UL (ref 4.8–10.8)

## 2019-03-29 RX ADMIN — HEPARIN SODIUM 1 UNIT: 5000 INJECTION, SOLUTION INTRAVENOUS; SUBCUTANEOUS at 20:20

## 2019-03-29 RX ADMIN — INSULIN ASPART 1 UNIT: 100 INJECTION, SOLUTION INTRAVENOUS; SUBCUTANEOUS at 12:22

## 2019-03-29 RX ADMIN — ASPIRIN 81 MG SCH MG: 81 TABLET ORAL at 08:08

## 2019-03-29 RX ADMIN — FOLIC ACID SCH MLS/HR: 5 INJECTION, SOLUTION INTRAMUSCULAR; INTRAVENOUS; SUBCUTANEOUS at 12:19

## 2019-03-29 RX ADMIN — HEPARIN SODIUM 1 UNIT: 5000 INJECTION, SOLUTION INTRAVENOUS; SUBCUTANEOUS at 14:24

## 2019-03-29 RX ADMIN — FOLIC ACID SCH MLS/HR: 5 INJECTION, SOLUTION INTRAMUSCULAR; INTRAVENOUS; SUBCUTANEOUS at 17:38

## 2019-03-29 RX ADMIN — AZITHROMYCIN MONOHYDRATE SCH MLS/HR: 500 INJECTION, POWDER, LYOPHILIZED, FOR SOLUTION INTRAVENOUS at 08:01

## 2019-03-29 RX ADMIN — ATORVASTATIN CALCIUM SCH MG: 40 TABLET, FILM COATED ORAL at 20:16

## 2019-03-29 RX ADMIN — AZITHROMYCIN MONOHYDRATE 1 MLS/HR: 500 INJECTION, POWDER, LYOPHILIZED, FOR SOLUTION INTRAVENOUS at 00:18

## 2019-03-29 RX ADMIN — INSULIN GLARGINE SCH UNITS: 100 INJECTION, SOLUTION SUBCUTANEOUS at 08:01

## 2019-03-29 RX ADMIN — IPRATROPIUM BROMIDE AND ALBUTEROL SULFATE SCH ML: .5; 3 SOLUTION RESPIRATORY (INHALATION) at 17:32

## 2019-03-29 RX ADMIN — CLOPIDOGREL 1 MG: 75 TABLET, FILM COATED ORAL at 08:02

## 2019-03-29 RX ADMIN — CEFTRIAXONE 1 MLS/HR: 1 INJECTION, SOLUTION INTRAVENOUS at 21:16

## 2019-03-29 RX ADMIN — THIAMINE HYDROCHLORIDE 1 MLS/HR: 100 INJECTION, SOLUTION INTRAMUSCULAR; INTRAVENOUS at 17:38

## 2019-03-29 RX ADMIN — FOLIC ACID SCH MLS/HR: 5 INJECTION, SOLUTION INTRAMUSCULAR; INTRAVENOUS; SUBCUTANEOUS at 02:33

## 2019-03-29 RX ADMIN — IPRATROPIUM BROMIDE AND ALBUTEROL SULFATE SCH ML: .5; 3 SOLUTION RESPIRATORY (INHALATION) at 20:45

## 2019-03-29 RX ADMIN — GUAIFENESIN 1 MG: 100 SOLUTION ORAL at 07:57

## 2019-03-29 RX ADMIN — LISINOPRIL SCH MG: 5 TABLET ORAL at 08:09

## 2019-03-29 RX ADMIN — CEFTRIAXONE SCH MLS/HR: 1 INJECTION, SOLUTION INTRAVENOUS at 21:16

## 2019-03-29 RX ADMIN — ASPIRIN 81 MG CHEWABLE TABLET 1 MG: 81 TABLET CHEWABLE at 08:08

## 2019-03-29 RX ADMIN — THIAMINE HYDROCHLORIDE 1 MLS/HR: 100 INJECTION, SOLUTION INTRAMUSCULAR; INTRAVENOUS at 02:33

## 2019-03-29 RX ADMIN — IPRATROPIUM BROMIDE AND ALBUTEROL SULFATE 1 ML: .5; 3 SOLUTION RESPIRATORY (INHALATION) at 20:45

## 2019-03-29 RX ADMIN — AZITHROMYCIN MONOHYDRATE 1 MLS/HR: 500 INJECTION, POWDER, LYOPHILIZED, FOR SOLUTION INTRAVENOUS at 08:01

## 2019-03-29 RX ADMIN — INSULIN ASPART 1 UNIT: 100 INJECTION, SOLUTION INTRAVENOUS; SUBCUTANEOUS at 08:00

## 2019-03-29 RX ADMIN — IPRATROPIUM BROMIDE AND ALBUTEROL SULFATE 1 ML: .5; 3 SOLUTION RESPIRATORY (INHALATION) at 09:12

## 2019-03-29 RX ADMIN — HEPARIN SODIUM SCH UNIT: 5000 INJECTION, SOLUTION INTRAVENOUS; SUBCUTANEOUS at 14:24

## 2019-03-29 RX ADMIN — LISINOPRIL 1 MG: 5 TABLET ORAL at 08:09

## 2019-03-29 RX ADMIN — INSULIN ASPART 1 UNIT: 100 INJECTION, SOLUTION INTRAVENOUS; SUBCUTANEOUS at 17:57

## 2019-03-29 RX ADMIN — IPRATROPIUM BROMIDE AND ALBUTEROL SULFATE SCH ML: .5; 3 SOLUTION RESPIRATORY (INHALATION) at 09:12

## 2019-03-29 RX ADMIN — INSULIN GLARGINE 1 UNITS: 100 INJECTION, SOLUTION SUBCUTANEOUS at 08:01

## 2019-03-29 RX ADMIN — CEFTRIAXONE SCH MLS/HR: 1 INJECTION, SOLUTION INTRAVENOUS at 09:52

## 2019-03-29 RX ADMIN — IPRATROPIUM BROMIDE AND ALBUTEROL SULFATE SCH ML: .5; 3 SOLUTION RESPIRATORY (INHALATION) at 13:24

## 2019-03-29 RX ADMIN — IPRATROPIUM BROMIDE AND ALBUTEROL SULFATE 1 ML: .5; 3 SOLUTION RESPIRATORY (INHALATION) at 13:24

## 2019-03-29 RX ADMIN — INSULIN ASPART 1 UNIT: 100 INJECTION, SOLUTION INTRAVENOUS; SUBCUTANEOUS at 20:24

## 2019-03-29 RX ADMIN — HEPARIN SODIUM SCH UNIT: 5000 INJECTION, SOLUTION INTRAVENOUS; SUBCUTANEOUS at 20:20

## 2019-03-29 RX ADMIN — CEFTRIAXONE 1 MLS/HR: 1 INJECTION, SOLUTION INTRAVENOUS at 09:52

## 2019-03-29 RX ADMIN — CLOPIDOGREL SCH MG: 75 TABLET, FILM COATED ORAL at 08:02

## 2019-03-29 RX ADMIN — THIAMINE HYDROCHLORIDE 1 MLS/HR: 100 INJECTION, SOLUTION INTRAMUSCULAR; INTRAVENOUS at 12:19

## 2019-03-29 RX ADMIN — ATORVASTATIN CALCIUM 1 MG: 40 TABLET, FILM COATED ORAL at 20:16

## 2019-03-29 RX ADMIN — MAGNESIUM SULFATE HEPTAHYDRATE 1 MLS/HR: 40 INJECTION, SOLUTION INTRAVENOUS at 23:02

## 2019-03-29 RX ADMIN — IPRATROPIUM BROMIDE AND ALBUTEROL SULFATE 1 ML: .5; 3 SOLUTION RESPIRATORY (INHALATION) at 17:32

## 2019-03-29 NOTE — PN
Date/Time of Note


Date/Time of Note


DATE: 3/29/19 


TIME: 13:07





Assessment/Plan


VTE Prophylaxis


Risk score (from [place])>0 risk:  2


[place] [facility] [place]  [place]


Pharmacological prophylaxis:  heparin





Lines/Catheters


IV Catheter Type (from [facility]):  Peripheral IV





Assessment/Plan


Assessment/Plan


1. Community acquired pneumonia, on rocephin and zithromax


2. Sepsis, IVF and antibiotics


3. DM, on metformin and lantus/ISS


4. CAD with stents: no chest pain, on aspirin, antiplatelet, lipitor


5. Ischemic cardiomyopathy (EF 40% in 2017), compensated, on coreg and 


lisinopril


6. History of ascending aortic aneurysm: No acute issue


7. Hypertension, controlled


8. DVT prophylaxis:


Result Diagram:  


3/29/19 0546                                                                    


           3/29/19 0546





Results 24hrs





Laboratory Tests


Test
                3/28/19
22:18  3/28/19
22:19   3/28/19
22:28  3/28/19
23:56


White Blood Count         13.8  #H


Red Blood Count            3.98  L


Hemoglobin                 11.9  L


Hematocrit                 36.0  L


Mean Corpuscular            90.5


Volume


Mean Corpuscular            29.9


Hemoglobin


Mean Corpuscular           33.1  
  
              
               



Hemoglobin
Concent


Red Cell                    12.0


Distribution Width


Platelet Count              262  #


Mean Platelet              11.1  H


Volume


Immature                  1.400  H


Granulocytes %


Neutrophils %              77.8  H


Lymphocytes %               9.5  L


Monocytes %                 10.8


Eosinophils %                0.2


Basophils %                  0.3


Nucleated Red Blood          0.0


Cells %


Immature                  0.200  H


Granulocytes #


Neutrophils #              10.7  H


Lymphocytes #                1.3


Monocytes #                 1.5  H


Eosinophils #                0.0


Basophils #                  0.0


Nucleated Red Blood          0.0


Cells #


Prothrombin Time            14.1


Prothrombin Time             1.1


Ratio


INR International          1.08  
  
              
               



Normalized
Ratio


Activated                  31.4  
  
              
               



Partial
Thromboplas


t Time


Sodium Level                 136


Potassium Level              4.3


Chloride Level                97


Carbon Dioxide                24


Level


Anion Gap                    15  H


Blood Urea Nitrogen          22  H


Creatinine                  0.95


Est Glomerular       > 60  
        
              
               



Filtrat Rate
mL/min


Glucose Level                216


Lactic Acid Level            1.1                                           1.2


Calcium Level                9.1


Total Bilirubin              0.6


Direct Bilirubin            0.00


Indirect Bilirubin           0.6


Aspartate Amino             11  L
  
              
               



Transf
(AST/SGOT)


Alanine                     12  L
  
              
               



Aminotransferase
(A


LT/SGPT)


Alkaline                      76


Phosphatase


Troponin I           < 0.012


Total Protein                7.1


Albumin                      3.8


Globulin                   3.30  H


Albumin/Globulin            1.15


Ratio


Urine Color                         YELLOW


Urine Clarity                       CLEAR


Urine pH                                    5.0


Urine Specific                            1.025


Gravity


Urine Ketones                               1+  H


Urine Nitrite                       NEGATIVE


Urine Bilirubin                     NEGATIVE


Urine Urobilinogen                          1+  H


Urine Leukocyte                     NEGATIVE


Esterase


Urine Microscopic                             1


RBC


Urine Microscopic                             1


WBC


Urine Mucus                         MODERATE


Urine Hemoglobin                    NEGATIVE


Urine Glucose                       NEGATIVE


Urine Total Protein                         1+  H


Bedside Urine pH                                            5.0


(LAB)


Bedside Urine                                               2+  H


Protein (LAB)


Bedside Urine                                      Negative


Glucose (UA)


Bedside Urine                                               2+  H


Ketones (LAB)


Bedside Urine Blood                                Trace-lysed  H


Bedside Urine                                      Negative


Nitrite (LAB)


Bedside Urine        
              
              Negative  
     



Leukocyte
Esterase


(L


Test
                3/29/19
05:46  3/29/19
07:51   3/29/19
12:16  



White Blood Count          12.5  H


Red Blood Count            3.71  L


Hemoglobin                 11.1  L


Hematocrit                 34.0  L


Mean Corpuscular            91.6


Volume


Mean Corpuscular            29.9


Hemoglobin


Mean Corpuscular           32.6  
  
              
               



Hemoglobin
Concent


Red Cell                    12.1


Distribution Width


Platelet Count               225


Mean Platelet              11.2  H


Volume


Immature                  1.400  H


Granulocytes %


Neutrophils %               75.0


Lymphocytes %              10.6  L


Monocytes %                12.0  H


Eosinophils %                0.5


Basophils %                  0.5


Nucleated Red Blood          0.0


Cells %


Immature                  0.170  H


Granulocytes #


Neutrophils #               9.4  H


Lymphocytes #                1.3


Monocytes #                 1.5  H


Eosinophils #                0.1


Basophils #                  0.1


Nucleated Red Blood          0.0


Cells #


Sodium Level                 137


Potassium Level              4.2


Chloride Level               101


Carbon Dioxide                25


Level


Anion Gap                     11


Blood Urea Nitrogen           20


Creatinine                  0.84


Est Glomerular       > 60  
        
              
               



Filtrat Rate
mL/min


Glucose Level               254  H


Hemoglobin A1c              9.2  H


Lactic Acid Level            1.2


Calcium Level               8.3  L


Magnesium Level             1.5  L


Total Bilirubin              0.4


Direct Bilirubin            0.00


Indirect Bilirubin           0.4


Aspartate Amino             10  L
  
              
               



Transf
(AST/SGOT)


Alanine                      16  
  
              
               



Aminotransferase
(A


LT/SGPT)


Alkaline                      67


Phosphatase


Total Protein                6.3


Albumin                      3.3


Globulin                    3.00


Albumin/Globulin            1.10


Ratio


Triglycerides Level           53


Cholesterol Level            57  L


LDL Cholesterol,              25


Calculated


HDL Cholesterol              21  L


Cholesterol/HDL              2.7


Ratio


Thyroid Stimulating       2.310  
  
              
               



Hormone
(TSH)


Bedside Glucose                             211            246  H








Subjective


24 Hr Interval Summary


Free Text/Dictation


feels better, sputum turns from greenish yesterday to yellowish today





Exam/Review of Systems


Exam


Vitals





Vital Signs


  Date      Temp  Pulse  Resp  B/P (MAP)   Pulse Ox  O2          O2 Flow    FiO2


Time                                                 Delivery    Rate


   3/29/19           82


     12:00


   3/29/19                                       95  Nasal             3.0


     09:12                                           Cannula


   3/29/19  99.5           28      128/70


     08:00                           (89)








Intake and Output





3/28/19


3/28/19


3/29/19





1414:59


22:59


06:59





IntakeIntake Total


540 ml





BalanceBalance


540 ml











Constitutional:  alert, oriented, well developed


Psych:  no complaints, nl mood/affect


Head:  normocephalic, atraumatic


Eyes:  nl conjunctiva, EOMI, nl lids, PERRL


ENMT:  nl external ears & nose, nl lips & teeth, nl nasal mucosa & septum


Neck:  supple, non-tender


Respiratory:  congested cough, crackles/rales


Cardiovascular:  regular rate and rhythm, nl pulses; 


   No bruits, No diastolic murmur, No edema, No gallop, No irregular rhythm, No 


jugular venous distention (JVD), No murmurs/extra sounds, No rub, No systolic 


murmur, No S3, No S4, No other


Gastrointestinal:  soft, nl liver, spleen, non-tender


Musculoskeletal:  nl extremities to inspection


Extremities:  normal pulses; 


   No calf tenderness, No cyanosis, No clubbing, No edema, No pitting pedal 


edema, No palpable cord, No tenderness, No other


Neurological:  CNS II-XII intact, nl mental status, nl speech, nl strength





Results


Results 24hrs





Laboratory Tests


Test
                3/28/19
22:18  3/28/19
22:19   3/28/19
22:28  3/28/19
23:56


White Blood Count         13.8  #H


Red Blood Count            3.98  L


Hemoglobin                 11.9  L


Hematocrit                 36.0  L


Mean Corpuscular            90.5


Volume


Mean Corpuscular            29.9


Hemoglobin


Mean Corpuscular           33.1  
  
              
               



Hemoglobin
Concent


Red Cell                    12.0


Distribution Width


Platelet Count              262  #


Mean Platelet              11.1  H


Volume


Immature                  1.400  H


Granulocytes %


Neutrophils %              77.8  H


Lymphocytes %               9.5  L


Monocytes %                 10.8


Eosinophils %                0.2


Basophils %                  0.3


Nucleated Red Blood          0.0


Cells %


Immature                  0.200  H


Granulocytes #


Neutrophils #              10.7  H


Lymphocytes #                1.3


Monocytes #                 1.5  H


Eosinophils #                0.0


Basophils #                  0.0


Nucleated Red Blood          0.0


Cells #


Prothrombin Time            14.1


Prothrombin Time             1.1


Ratio


INR International          1.08  
  
              
               



Normalized
Ratio


Activated                  31.4  
  
              
               



Partial
Thromboplas


t Time


Sodium Level                 136


Potassium Level              4.3


Chloride Level                97


Carbon Dioxide                24


Level


Anion Gap                    15  H


Blood Urea Nitrogen          22  H


Creatinine                  0.95


Est Glomerular       > 60  
        
              
               



Filtrat Rate
mL/min


Glucose Level                216


Lactic Acid Level            1.1                                           1.2


Calcium Level                9.1


Total Bilirubin              0.6


Direct Bilirubin            0.00


Indirect Bilirubin           0.6


Aspartate Amino             11  L
  
              
               



Transf
(AST/SGOT)


Alanine                     12  L
  
              
               



Aminotransferase
(A


LT/SGPT)


Alkaline                      76


Phosphatase


Troponin I           < 0.012


Total Protein                7.1


Albumin                      3.8


Globulin                   3.30  H


Albumin/Globulin            1.15


Ratio


Urine Color                         YELLOW


Urine Clarity                       CLEAR


Urine pH                                    5.0


Urine Specific                            1.025


Gravity


Urine Ketones                               1+  H


Urine Nitrite                       NEGATIVE


Urine Bilirubin                     NEGATIVE


Urine Urobilinogen                          1+  H


Urine Leukocyte                     NEGATIVE


Esterase


Urine Microscopic                             1


RBC


Urine Microscopic                             1


WBC


Urine Mucus                         MODERATE


Urine Hemoglobin                    NEGATIVE


Urine Glucose                       NEGATIVE


Urine Total Protein                         1+  H


Bedside Urine pH                                            5.0


(LAB)


Bedside Urine                                               2+  H


Protein (LAB)


Bedside Urine                                      Negative


Glucose (UA)


Bedside Urine                                               2+  H


Ketones (LAB)


Bedside Urine Blood                                Trace-lysed  H


Bedside Urine                                      Negative


Nitrite (LAB)


Bedside Urine        
              
              Negative  
     



Leukocyte
Esterase


(L


Test
                3/29/19
05:46  3/29/19
07:51   3/29/19
12:16  



White Blood Count          12.5  H


Red Blood Count            3.71  L


Hemoglobin                 11.1  L


Hematocrit                 34.0  L


Mean Corpuscular            91.6


Volume


Mean Corpuscular            29.9


Hemoglobin


Mean Corpuscular           32.6  
  
              
               



Hemoglobin
Concent


Red Cell                    12.1


Distribution Width


Platelet Count               225


Mean Platelet              11.2  H


Volume


Immature                  1.400  H


Granulocytes %


Neutrophils %               75.0


Lymphocytes %              10.6  L


Monocytes %                12.0  H


Eosinophils %                0.5


Basophils %                  0.5


Nucleated Red Blood          0.0


Cells %


Immature                  0.170  H


Granulocytes #


Neutrophils #               9.4  H


Lymphocytes #                1.3


Monocytes #                 1.5  H


Eosinophils #                0.1


Basophils #                  0.1


Nucleated Red Blood          0.0


Cells #


Sodium Level                 137


Potassium Level              4.2


Chloride Level               101


Carbon Dioxide                25


Level


Anion Gap                     11


Blood Urea Nitrogen           20


Creatinine                  0.84


Est Glomerular       > 60  
        
              
               



Filtrat Rate
mL/min


Glucose Level               254  H


Hemoglobin A1c              9.2  H


Lactic Acid Level            1.2


Calcium Level               8.3  L


Magnesium Level             1.5  L


Total Bilirubin              0.4


Direct Bilirubin            0.00


Indirect Bilirubin           0.4


Aspartate Amino             10  L
  
              
               



Transf
(AST/SGOT)


Alanine                      16  
  
              
               



Aminotransferase
(A


LT/SGPT)


Alkaline                      67


Phosphatase


Total Protein                6.3


Albumin                      3.3


Globulin                    3.00


Albumin/Globulin            1.10


Ratio


Triglycerides Level           53


Cholesterol Level            57  L


LDL Cholesterol,              25


Calculated


HDL Cholesterol              21  L


Cholesterol/HDL              2.7


Ratio


Thyroid Stimulating       2.310  
  
              
               



Hormone
(TSH)


Bedside Glucose                             211            246  H








Medications


Medication





Current Medications


Sodium Chloride 1,000 ml @  100 mls/hr Q10H IV  Last administered on 3/29/19at 


12:19; Admin Dose 100 MLS/HR;  Start 3/29/19 at 02:01;  Stop 3/29/19 at 23:00


IV Flush (NS 3 ml) 3 ml PER PROTOCOL IV ;  Start 3/29/19 at 02:30


Ondansetron HCl (Zofran Inj) 4 mg Q6H  PRN IV NAUSEA/VOMITING;  Start 3/29/19 at


02:30


Acetaminophen (Tylenol Tab) 650 mg Q6H  PRN PO .PAIN 1-3 OR TEMP;  Start 3/29/19


at 02:30


Aspirin (Aspirin) 81 mg DAILY PO  Last administered on 3/29/19at 08:08; Admin 


Dose 81 MG;  Start 3/29/19 at 09:00


Atorvastatin Calcium (Lipitor) 40 mg HS PO ;  Start 3/29/19 at 21:00


Carvedilol (Coreg) 6.25 mg BID PO  Last administered on 3/29/19at 08:08; Admin 


Dose 6.25 MG;  Start 3/29/19 at 09:00


Clopidogrel Bisulfate (plaVIX) 75 mg DAILY PO  Last administered on 3/29/19at 


08:02; Admin Dose 75 MG;  Start 3/29/19 at 09:00


Lisinopril (Zestril) 5 mg DAILY PO  Last administered on 3/29/19at 08:09; Admin 


Dose 5 MG;  Start 3/29/19 at 09:00


Metformin HCl (Glucophage) 500 mg WITH BREAKFAST  DINNE PO  Last administered on


3/29/19at 07:58; Admin Dose 500 MG;  Start 3/29/19 at 07:35


Nitroglycerin (Nitroglycerin (Sl Tab) 0.4 Mg) 1 tab Q5M  PRN SL CHEST PAIN;  


Start 3/29/19 at 02:30


Diagnostic Test (Pha) (Accu-Chek) 1 ea 02 XX ;  Start 3/30/19 at 02:00


Insulin Glargine (Lantus) 14 units DAILY@0800 SC  Last administered on 3/29/19at


08:01; Admin Dose 14 UNITS;  Start 3/29/19 at 08:00


Insulin Aspart (Novolog Insulin Pen) NOVOLOG *MILD* ALGORITHM WITH MEALS  


BEDTIME SC  Last administered on 3/29/19at 12:22; Admin Dose 3 UNIT;  Start 


3/29/19 at 07:35


Azithromycin 250 ml @  250 mls/hr DAILY IVPB  Last administered on 3/29/19at 


08:01; Admin Dose 250 MLS/HR;  Start 3/29/19 at 09:00


Miscellaneous Information 1 ea NOTE XX ;  Start 3/29/19 at 02:30


Glucose (Glutose) 15 gm Q15M  PRN PO DECREASED GLUCOSE;  Start 3/29/19 at 02:30


Glucose (Glutose) 22.5 gm Q15M  PRN PO DECREASED GLUCOSE;  Start 3/29/19 at 


02:30


Dextrose (D50w Syringe) 25 ml Q15M  PRN IV DECREASED GLUCOSE;  Start 3/29/19 at 


02:30


Dextrose (D50w Syringe) 50 ml Q15M  PRN IV DECREASED GLUCOSE;  Start 3/29/19 at 


02:30


Glucagon (Glucagen) 1 mg Q15M  PRN IM DECREASED GLUCOSE;  Start 3/29/19 at 02:30


Glucose (Glutose) 15 gm Q15M  PRN BUCCAL DECREASED GLUCOSE;  Start 3/29/19 at 


02:30


Ceftriaxone Sodium 50 ml @  100 mls/hr Q12H IVPB  Last administered on 3/29/19at


09:52; Admin Dose 100 MLS/HR;  Start 3/29/19 at 10:30


Albuterol/ Ipratropium (Duoneb) 3 ml Q4H RESP  THERAPY HHN  Last administered on


3/29/19at 09:12; Admin Dose 3 ML;  Start 3/29/19 at 09:00


Albuterol/ Ipratropium (Duoneb) 3 ml Q2H RESP THERAPY  PRN HHN SHORTNESS OF 


BREATH;  Start 3/29/19 at 06:30











ELANA MORROW MD                Mar 29, 2019 13:15

## 2019-03-29 NOTE — HP
Date/Time of Note


Date/Time of Note


DATE: 3/29/19 


TIME: 06:06





Assessment/Plan


VTE Prophylaxis


SCD applied (from [facility]):  Yes


Pharmacological prophylaxis:  NA/contraindicated


Pharm contraindication:  low risk/ambulating, other (Patient on dual 


antiplatelets.  No blood thinner for now because of the risk of bleeding.  


Patient also ambulatory)





Lines/Catheters


IV Catheter Type (from Nrsg):  Peripheral IV





Assessment/Plan


Assessment/Plan





1.  Sepsis, as evidenced by fever and leukocytosis: Secondary to community-a


cquired pneumonia


-Treat with Zithromax and ceftriaxone


-IV fluid


-Follow-up culture results including respiratory culture if possible





2.  CAD with stents: Continue antiplatelet, statin





3.  History of ascending aortic aneurysm: No acute issue





4.  Hypertension: BP is in acceptable range.  Continue home meds





5.  Diabetes: Insulin while in house





6.  Ischemic cardiomyopathy (EF 40% in 2017): Continue cardiac meds


Result Diagram:  


3/29/19 0546                                                                    


           3/28/19 2218





Results 24hrs





Laboratory Tests


Test
                3/28/19
22:18  3/28/19
22:19   3/28/19
22:28  3/28/19
23:56


White Blood Count         13.8  #H


Red Blood Count            3.98  L


Hemoglobin                 11.9  L


Hematocrit                 36.0  L


Mean Corpuscular            90.5


Volume


Mean Corpuscular            29.9


Hemoglobin


Mean Corpuscular           33.1  
  
              
               



Hemoglobin
Concent


Red Cell                    12.0


Distribution Width


Platelet Count              262  #


Mean Platelet              11.1  H


Volume


Immature                  1.400  H


Granulocytes %


Neutrophils %              77.8  H


Lymphocytes %               9.5  L


Monocytes %                 10.8


Eosinophils %                0.2


Basophils %                  0.3


Nucleated Red Blood          0.0


Cells %


Immature                  0.200  H


Granulocytes #


Neutrophils #              10.7  H


Lymphocytes #                1.3


Monocytes #                 1.5  H


Eosinophils #                0.0


Basophils #                  0.0


Nucleated Red Blood          0.0


Cells #


Prothrombin Time            14.1


Prothrombin Time             1.1


Ratio


[employer]R [employer]          1.08  
  
              
               



Normalized
Ratio


Activated                  31.4  
  
              
               



Partial
Thromboplas


t Time


Sodium Level                 136


Potassium Level              4.3


Chloride Level                97


Carbon Dioxide                24


Level


Anion Gap                    15  H


Blood Urea Nitrogen          22  H


Creatinine                  0.95


Est Glomerular       > 60  
        
              
               



Filtrat Rate
mL/min


Glucose Level                216


Lactic Acid Level            1.1                                           1.2


Calcium Level                9.1


Total Bilirubin              0.6


Direct Bilirubin            0.00


Indirect Bilirubin           0.6


Aspartate Amino             11  L
  
              
               



Transf
(AST/SGOT)


Alanine                     12  L
  
              
               



Aminotransferase
(A


LT/SGPT)


Alkaline                      76


Phosphatase


Troponin I           < 0.012


Total Protein                7.1


Albumin                      3.8


Globulin                   3.30  H


Albumin/Globulin            1.15


Ratio


Urine Color                         YELLOW


Urine Clarity                       CLEAR


Urine pH                                    5.0


Urine Specific                            1.025


Gravity


Urine Ketones                               1+  H


Urine Nitrite                       NEGATIVE


Urine Bilirubin                     NEGATIVE


Urine Urobilinogen                          1+  H


Urine Leukocyte                     NEGATIVE


Esterase


Urine Microscopic                             1


RBC


Urine Microscopic                             1


WBC


Urine Mucus                         MODERATE


Urine Hemoglobin                    NEGATIVE


Urine Glucose                       NEGATIVE


Urine Total Protein                         1+  H


Bedside Urine pH                                            5.0


(LAB)


Bedside Urine                                               2+  H


Protein (LAB)


Bedside Urine                                      Negative


Glucose (UA)


Bedside Urine                                               2+  H


Ketones (LAB)


Bedside Urine Blood                                Trace-lysed  H


Bedside Urine                                      Negative


Nitrite (LAB)


Bedside Urine        
              
              Negative  
     



Leukocyte
Esterase


(L


Test
                3/29/19
05:46  
              
               



White Blood Count          12.5  H


Red Blood Count            3.71  L


Hemoglobin                 11.1  L


Hematocrit                 34.0  L


Mean Corpuscular            91.6


Volume


Mean Corpuscular            29.9


Hemoglobin


Mean Corpuscular           32.6  
  
              
               



Hemoglobin
Concent


Red Cell                    12.1


Distribution Width


Platelet Count               225


Mean Platelet              11.2  H


Volume


Immature                  1.400  H


Granulocytes %


Neutrophils %               75.0


Lymphocytes %              10.6  L


Monocytes %                12.0  H


Eosinophils %                0.5


Basophils %                  0.5


Nucleated Red Blood          0.0


Cells %


Immature                  0.170  H


Granulocytes #


Neutrophils #               9.4  H


Lymphocytes #                1.3


Monocytes #                 1.5  H


Eosinophils #                0.1


Basophils #                  0.1


Nucleated Red Blood          0.0


Cells #








HPI/ROS


Admit Date/Time


Admit Date/Time


Mar 29, 2019 at 01:54





Hx of Present Illness





This is a 68-year-old male with a history of CAD with stent, ascending aortic 


aneurysm, diabetes, hypertension, ischemic cardiomyopathy (EF 40% in 2017).  


Patient was brought to the ER for productive cough, fever and congestion.  


Symptoms been progressively getting worse for the past 2 days also.  Cough has 


been productive of greenish/yellow sputum.  Denied chest pain.  Reported some 


shortness of breath.


When presented to ER, he was febrile with temperature of 101.5.  Labs shows a 


WBC of almost 14,000.  Chest x-ray shows Left lower lobe confluent interstitial 


infiltrate.





PMH/Family/Social


Past Medical History


Medical History:  other (See HPI)


Medications





Current Medications


Sodium Chloride 1,000 ml @  100 mls/hr Q10H IV  Last administered on 3/29/19at 


02:33; Admin Dose 100 MLS/HR;  Start 3/29/19 at 02:01;  Stop 3/29/19 at 23:00


IV Flush (NS 3 ml) 3 ml PER PROTOCOL IV ;  Start 3/29/19 at 02:30


Ondansetron HCl (Zofran Inj) 4 mg Q6H  PRN IV NAUSEA/VOMITING;  Start 3/29/19 at


02:30


Acetaminophen (Tylenol Tab) 650 mg Q6H  PRN PO .PAIN 1-3 OR TEMP;  Start 3/29/19


at 02:30


Aspirin (Aspirin) 81 mg DAILY PO ;  Start 3/29/19 at 09:00


Atorvastatin Calcium (Lipitor) 40 mg HS PO ;  Start 3/29/19 at 21:00


Carvedilol (Coreg) 6.25 mg BID PO ;  Start 3/29/19 at 09:00


Clopidogrel Bisulfate (plaVIX) 75 mg DAILY PO ;  Start 3/29/19 at 09:00


Lisinopril (Zestril) 5 mg DAILY PO ;  Start 3/29/19 at 09:00


Metformin HCl (Glucophage) 500 mg WITH BREAKFAST  DINNE PO ;  Start 3/29/19 at 


07:35


Nitroglycerin (Nitroglycerin (Sl Tab) 0.4 Mg) 1 tab Q5M  PRN SL CHEST PAIN;  


Start 3/29/19 at 02:30


Diagnostic Test (Pha) (Accu-Chek) 1 ea 02 XX ;  Start 3/30/19 at 02:00


Insulin Glargine (Lantus) 14 units DAILY@0800 SC ;  Start 3/29/19 at 08:00


Insulin Aspart (Novolog Insulin Pen) NOVOLOG *MILD* ALGORITHM WITH MEALS  


BEDTIME SC ;  Start 3/29/19 at 07:35


Azithromycin 250 ml @  250 mls/hr DAILY IVPB ;  Start 3/29/19 at 09:00


Miscellaneous Information 1 ea NOTE XX ;  Start 3/29/19 at 02:30


Glucose (Glutose) 15 gm Q15M  PRN PO DECREASED GLUCOSE;  Start 3/29/19 at 02:30


Glucose (Glutose) 22.5 gm Q15M  PRN PO DECREASED GLUCOSE;  Start 3/29/19 at 


02:30


Dextrose (D50w Syringe) 25 ml Q15M  PRN IV DECREASED GLUCOSE;  Start 3/29/19 at 


02:30


Dextrose (D50w Syringe) 50 ml Q15M  PRN IV DECREASED GLUCOSE;  Start 3/29/19 at 


02:30


Glucagon (Glucagen) 1 mg Q15M  PRN IM DECREASED GLUCOSE;  Start 3/29/19 at 02:30


Glucose (Glutose) 15 gm Q15M  PRN BUCCAL DECREASED GLUCOSE;  Start 3/29/19 at 0


2:30


Ceftriaxone Sodium 50 ml @  100 mls/hr Q12H IVPB ;  Start 3/29/19 at 10:30


Coded Allergies:  


     No Known Allergy (Unverified , 3/28/19)





Past Surgical History


Past Surgical Hx:  other (Cardiac stent)





Family History


Significant Family History:  no pertinent family hx





Social History


Alcohol Use:  none


Smoking Status:  Former smoker


Drug Use:  none





Exam/Review of Systems


Vital Signs


Vitals





Vital Signs


  Date      Temp  Pulse  Resp  B/P (MAP)   Pulse Ox  O2          O2 Flow    FiO2


Time                                                 Delivery    Rate


   3/29/19                                       93  Nasal             4.0


     05:15                                           Cannula


   3/29/19  99.3     83    24      127/60


     05:00                           (82)








Intake and Output





3/28/19


3/28/19


3/29/19





1515:00


23:00


07:00





IntakeIntake Total


240 ml





BalanceBalance


240 ml














Exam


Constitutional:  other (No acute distress)


Head:  normocephalic, atraumatic


Eyes:  EOMI, PERRL


Respiratory:  other (Decreased breath sounds in the left side)


Cardiovascular:  regular rate and rhythm, nl pulses


Gastrointestinal:  soft


Extremities:  normal pulses











TATIANNA NOVAK MD                 Mar 29, 2019 06:11

## 2019-03-30 VITALS — RESPIRATION RATE: 20 BRPM | DIASTOLIC BLOOD PRESSURE: 65 MMHG | SYSTOLIC BLOOD PRESSURE: 134 MMHG | HEART RATE: 86 BPM

## 2019-03-30 VITALS — HEART RATE: 71 BPM

## 2019-03-30 VITALS — HEART RATE: 71 BPM | DIASTOLIC BLOOD PRESSURE: 73 MMHG | RESPIRATION RATE: 20 BRPM | SYSTOLIC BLOOD PRESSURE: 130 MMHG

## 2019-03-30 VITALS — DIASTOLIC BLOOD PRESSURE: 64 MMHG | HEART RATE: 79 BPM | SYSTOLIC BLOOD PRESSURE: 132 MMHG | RESPIRATION RATE: 20 BRPM

## 2019-03-30 VITALS — HEART RATE: 78 BPM

## 2019-03-30 VITALS — SYSTOLIC BLOOD PRESSURE: 117 MMHG | RESPIRATION RATE: 18 BRPM | DIASTOLIC BLOOD PRESSURE: 64 MMHG | HEART RATE: 76 BPM

## 2019-03-30 VITALS — HEART RATE: 80 BPM | DIASTOLIC BLOOD PRESSURE: 61 MMHG | SYSTOLIC BLOOD PRESSURE: 126 MMHG | RESPIRATION RATE: 20 BRPM

## 2019-03-30 VITALS — HEART RATE: 69 BPM | RESPIRATION RATE: 19 BRPM | DIASTOLIC BLOOD PRESSURE: 62 MMHG | SYSTOLIC BLOOD PRESSURE: 114 MMHG

## 2019-03-30 VITALS — HEART RATE: 65 BPM

## 2019-03-30 VITALS — HEART RATE: 74 BPM

## 2019-03-30 LAB
% IRON SATURATION: 9 % SAT (ref 22–52)
ADD MAN DIFF?: NO
ANION GAP: 6 (ref 5–13)
BASOPHIL #: 0.1 10^3/UL (ref 0–0.1)
BASOPHILS %: 0.4 % (ref 0–2)
BLOOD UREA NITROGEN: 15 MG/DL (ref 7–20)
CALCIUM: 8.4 MG/DL (ref 8.4–10.2)
CARBON DIOXIDE: 27 MMOL/L (ref 21–31)
CHLORIDE: 104 MMOL/L (ref 97–110)
CREATININE: 0.82 MG/DL (ref 0.61–1.24)
EOSINOPHILS #: 0.1 10^3/UL (ref 0–0.5)
EOSINOPHILS %: 0.5 % (ref 0–7)
GLUCOSE: 164 MG/DL (ref 70–220)
HEMATOCRIT: 34.5 % (ref 42–52)
HEMOGLOBIN: 11 G/DL (ref 14–18)
HEPATITIS C VIRAL ANTIBODY: NEGATIVE
IMMATURE GRANS #M: 0.19 10^3/UL (ref 0–0.03)
IMMATURE GRANS % (M): 1.6 % (ref 0–0.43)
IRON: 17 UG/DL (ref 35–150)
LYMPHOCYTES #: 1.6 10^3/UL (ref 0.8–2.9)
LYMPHOCYTES %: 13.1 % (ref 15–51)
MAGNESIUM: 2.2 MG/DL (ref 1.7–2.5)
MEAN CORPUSCULAR HEMOGLOBIN: 29.7 PG (ref 29–33)
MEAN CORPUSCULAR HGB CONC: 31.9 G/DL (ref 32–37)
MEAN CORPUSCULAR VOLUME: 93.2 FL (ref 82–101)
MEAN PLATELET VOLUME: 11.1 FL (ref 7.4–10.4)
MONOCYTE #: 1.2 10^3/UL (ref 0.3–0.9)
MONOCYTES %: 9.8 % (ref 0–11)
NEUTROPHIL #: 9 10^3/UL (ref 1.6–7.5)
NEUTROPHILS %: 74.6 % (ref 39–77)
NUCLEATED RED BLOOD CELLS #: 0 10^3/UL (ref 0–0)
NUCLEATED RED BLOOD CELLS%: 0 /100WBC (ref 0–0)
PHOSPHORUS: 3.7 MG/DL (ref 2.5–4.9)
PLATELET COUNT: 254 10^3/UL (ref 140–415)
POTASSIUM: 4.3 MMOL/L (ref 3.5–5.1)
RED BLOOD COUNT: 3.7 10^6/UL (ref 4.7–6.1)
RED CELL DISTRIBUTION WIDTH: 12.2 % (ref 11.5–14.5)
SODIUM: 137 MMOL/L (ref 135–144)
TOTAL IRON BINDING CAPACITY: 182 UG/DL (ref 241–421)
WHITE BLOOD COUNT: 12.1 10^3/UL (ref 4.8–10.8)

## 2019-03-30 RX ADMIN — INSULIN ASPART 1 UNIT: 100 INJECTION, SOLUTION INTRAVENOUS; SUBCUTANEOUS at 12:14

## 2019-03-30 RX ADMIN — IPRATROPIUM BROMIDE AND ALBUTEROL SULFATE SCH ML: .5; 3 SOLUTION RESPIRATORY (INHALATION) at 00:48

## 2019-03-30 RX ADMIN — IPRATROPIUM BROMIDE AND ALBUTEROL SULFATE 1 ML: .5; 3 SOLUTION RESPIRATORY (INHALATION) at 14:27

## 2019-03-30 RX ADMIN — CLOPIDOGREL 1 MG: 75 TABLET, FILM COATED ORAL at 08:02

## 2019-03-30 RX ADMIN — CEFTRIAXONE 1 MLS/HR: 1 INJECTION, SOLUTION INTRAVENOUS at 10:02

## 2019-03-30 RX ADMIN — IPRATROPIUM BROMIDE AND ALBUTEROL SULFATE 1 ML: .5; 3 SOLUTION RESPIRATORY (INHALATION) at 20:00

## 2019-03-30 RX ADMIN — AZITHROMYCIN MONOHYDRATE SCH MLS/HR: 500 INJECTION, POWDER, LYOPHILIZED, FOR SOLUTION INTRAVENOUS at 10:35

## 2019-03-30 RX ADMIN — IPRATROPIUM BROMIDE AND ALBUTEROL SULFATE SCH ML: .5; 3 SOLUTION RESPIRATORY (INHALATION) at 14:27

## 2019-03-30 RX ADMIN — HEPARIN SODIUM 1 UNIT: 5000 INJECTION, SOLUTION INTRAVENOUS; SUBCUTANEOUS at 20:44

## 2019-03-30 RX ADMIN — HEPARIN SODIUM SCH UNIT: 5000 INJECTION, SOLUTION INTRAVENOUS; SUBCUTANEOUS at 08:14

## 2019-03-30 RX ADMIN — CEFTRIAXONE 1 MLS/HR: 1 INJECTION, SOLUTION INTRAVENOUS at 22:50

## 2019-03-30 RX ADMIN — LISINOPRIL SCH MG: 5 TABLET ORAL at 08:03

## 2019-03-30 RX ADMIN — IPRATROPIUM BROMIDE AND ALBUTEROL SULFATE 1 ML: .5; 3 SOLUTION RESPIRATORY (INHALATION) at 10:43

## 2019-03-30 RX ADMIN — CEFTRIAXONE SCH MLS/HR: 1 INJECTION, SOLUTION INTRAVENOUS at 10:02

## 2019-03-30 RX ADMIN — INSULIN ASPART 1 UNIT: 100 INJECTION, SOLUTION INTRAVENOUS; SUBCUTANEOUS at 20:45

## 2019-03-30 RX ADMIN — INSULIN ASPART 1 UNIT: 100 INJECTION, SOLUTION INTRAVENOUS; SUBCUTANEOUS at 17:45

## 2019-03-30 RX ADMIN — IPRATROPIUM BROMIDE AND ALBUTEROL SULFATE SCH ML: .5; 3 SOLUTION RESPIRATORY (INHALATION) at 10:43

## 2019-03-30 RX ADMIN — CLOPIDOGREL SCH MG: 75 TABLET, FILM COATED ORAL at 08:02

## 2019-03-30 RX ADMIN — IPRATROPIUM BROMIDE AND ALBUTEROL SULFATE 1 ML: .5; 3 SOLUTION RESPIRATORY (INHALATION) at 17:00

## 2019-03-30 RX ADMIN — ASPIRIN 81 MG CHEWABLE TABLET 1 MG: 81 TABLET CHEWABLE at 08:02

## 2019-03-30 RX ADMIN — IPRATROPIUM BROMIDE AND ALBUTEROL SULFATE 1 ML: .5; 3 SOLUTION RESPIRATORY (INHALATION) at 05:14

## 2019-03-30 RX ADMIN — HEPARIN SODIUM SCH UNIT: 5000 INJECTION, SOLUTION INTRAVENOUS; SUBCUTANEOUS at 20:44

## 2019-03-30 RX ADMIN — HEPARIN SODIUM 1 UNIT: 5000 INJECTION, SOLUTION INTRAVENOUS; SUBCUTANEOUS at 08:14

## 2019-03-30 RX ADMIN — LISINOPRIL 1 MG: 5 TABLET ORAL at 08:03

## 2019-03-30 RX ADMIN — IPRATROPIUM BROMIDE AND ALBUTEROL SULFATE SCH ML: .5; 3 SOLUTION RESPIRATORY (INHALATION) at 05:14

## 2019-03-30 RX ADMIN — IPRATROPIUM BROMIDE AND ALBUTEROL SULFATE SCH ML: .5; 3 SOLUTION RESPIRATORY (INHALATION) at 17:00

## 2019-03-30 RX ADMIN — ATORVASTATIN CALCIUM SCH MG: 40 TABLET, FILM COATED ORAL at 20:41

## 2019-03-30 RX ADMIN — ATORVASTATIN CALCIUM 1 MG: 40 TABLET, FILM COATED ORAL at 20:41

## 2019-03-30 RX ADMIN — INSULIN ASPART 1 UNIT: 100 INJECTION, SOLUTION INTRAVENOUS; SUBCUTANEOUS at 08:14

## 2019-03-30 RX ADMIN — INSULIN GLARGINE 1 UNITS: 100 INJECTION, SOLUTION SUBCUTANEOUS at 12:14

## 2019-03-30 RX ADMIN — AZITHROMYCIN MONOHYDRATE 1 MLS/HR: 500 INJECTION, POWDER, LYOPHILIZED, FOR SOLUTION INTRAVENOUS at 10:35

## 2019-03-30 RX ADMIN — IPRATROPIUM BROMIDE AND ALBUTEROL SULFATE SCH ML: .5; 3 SOLUTION RESPIRATORY (INHALATION) at 20:00

## 2019-03-30 RX ADMIN — IPRATROPIUM BROMIDE AND ALBUTEROL SULFATE 1 ML: .5; 3 SOLUTION RESPIRATORY (INHALATION) at 00:48

## 2019-03-30 RX ADMIN — CEFTRIAXONE SCH MLS/HR: 1 INJECTION, SOLUTION INTRAVENOUS at 22:50

## 2019-03-30 RX ADMIN — ACETAMINOPHEN 1 MG: 325 TABLET, FILM COATED ORAL at 00:19

## 2019-03-30 RX ADMIN — ASPIRIN 81 MG SCH MG: 81 TABLET ORAL at 08:02

## 2019-03-30 RX ADMIN — INSULIN GLARGINE SCH UNITS: 100 INJECTION, SOLUTION SUBCUTANEOUS at 12:14

## 2019-03-30 NOTE — PN
Date/Time of Note


Date/Time of Note


DATE: 3/30/19 


TIME: 11:00





Assessment/Plan


VTE Prophylaxis


Risk score (from Ns)>0 risk:  4


SCD applied (from [facility]):  Yes


SCD contraindicated:  low risk/ambulating


Pharmacological prophylaxis:  heparin





Lines/Catheters


IV Catheter Type (from [facility]):  Peripheral IV





Assessment/Plan


Problems:  


(1) Left lower lobe pneumonia


Status:  Acute


Comment:  Proving with antibiotic therapy.  Continue treatment.  No further i


ndication for telemetry


Qualifiers:  


   Pneumonia type:  due to unspecified organism  Qualified Codes:  J18.1 - Lobar


pneumonia, unspecified organism


(2) Coronary artery disease


Status:  Chronic


Comment:  Stable and quiescent at this time


Qualifiers:  


   Coronary Disease-Associated Artery/Lesion type:  native artery  Native vs. 


transplanted heart:  native heart  Associated angina:  without angina  Qualified


Codes:  I25.10 - Atherosclerotic heart disease of native coronary artery without


angina pectoris


(3) H/O heart artery stent


Status:  Chronic


Comment:  Stable and operational





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


Status:  Chronic


Comment:  Compensated with medical management





(5) Grade I diastolic dysfunction


Status:  Chronic


Comment:  Trolled and compensated with medical management and blood pressure 


control





(6) Diabetes mellitus type 2 in obese


Status:  Chronic


Comment:  Adequate glycemic control in the hospital however his outpatient 


control was poor





(7) Hyperlipidemia


Status:  Chronic


Comment:  Continue with aggressive risk factor modification


Qualifiers:  


   Hyperlipidemia type:  pure hypercholesterolemia  Qualified Codes:  E78.00 - 


Pure hypercholesterolemia, unspecified


(8) Essential hypertension


Status:  Chronic


Comment:  Adequate control using beta-blocker and ACE inhibitor





(9) Obesity (BMI 30.0-34.9)


Status:  Chronic


Comment:  Calorie restriction diet





(10) Anemia


Status:  Acute


Comment:  Basic workup


Qualifiers:  


   Anemia type:  unspecified type  Qualified Codes:  D64.9 - Anemia, unspecified


Result Diagram:  


3/30/19 0546                                                                    


           3/30/19 0546





Results 24hrs





Laboratory Tests


Test
                 3/29/19
12:16  3/29/19
17:25  3/29/19
19:59  3/30/19
01:40


Bedside Glucose              246  H          191           267  H          150


Test
                 3/30/19
05:46  3/30/19
07:58  
              



White Blood Count           12.1  H


Red Blood Count             3.70  L


Hemoglobin                  11.0  L


Hematocrit                  34.5  L


Mean Corpuscular             93.2


Volume


Mean Corpuscular             29.7


Hemoglobin


Mean Corpuscular           31.9  L
  
              
              



Hemoglobin
Concent


Red Cell                     12.2


Distribution Width


Platelet Count                254


Mean Platelet Volume        11.1  H


Immature                   1.600  H


Granulocytes %


Neutrophils %                74.6


Lymphocytes %               13.1  L


Monocytes %                   9.8


Eosinophils %                 0.5


Basophils %                   0.4


Nucleated Red Blood           0.0


Cells %


Immature                   0.190  H


Granulocytes #


Neutrophils #                9.0  H


Lymphocytes #                 1.6


Monocytes #                  1.2  H


Eosinophils #                 0.1


Basophils #                   0.1


Nucleated Red Blood           0.0


Cells #


Sodium Level                  137


Potassium Level               4.3


Chloride Level                104


Carbon Dioxide Level           27


Anion Gap                       6


Blood Urea Nitrogen            15


Creatinine                   0.82


Est Glomerular        > 60  
        
              
              



Filtrat Rate
mL/min


Glucose Level                 164


Calcium Level                 8.4


Phosphorus Level              3.7


Magnesium Level               2.2


Bedside Glucose                              161








Subjective


24 Hr Interval Summary


Free Text/Dictation


Patient reports he is feeling a little bit better.  There is less cough and less


shortness of breath.


Constitutional:  no complaints (Denies any fevers chills or sweats)


ENT:  no complaints


Respiratory:  no complaints (As above cough is diminished no sputum production 


less shortness of breath)


Cardiovascular:  no complaints (Chest pain no palpitations no PND no orthopnea)


Gastrointestinal:  no complaints (No nausea or vomiting)


Genitourinary:  no complaints


Neurologic:  no complaints





Exam/Review of Systems


Exam


Vitals





Vital Signs


  Date      Temp  Pulse  Resp  B/P (MAP)   Pulse Ox  O2          O2 Flow    FiO2


Time                                                 Delivery    Rate


   3/30/19           71


     09:11


   3/30/19  97.7           19      114/62        93  Room Air          2.0


     07:55                           (79)





Constitutional:  alert, oriented


Neck:  supple, non-tender


Respiratory:  normal air movement, crackles/rales (Left basilar crackles and 


egophony)


Cardiovascular:  regular rate and rhythm, nl pulses


Gastrointestinal:  soft, nl liver, spleen, non-tender





Results


Results 24hrs





Laboratory Tests


Test
                 3/29/19
12:16  3/29/19
17:25  3/29/19
19:59  3/30/19
01:40


Bedside Glucose              246  H          191           267  H          150


Test
                 3/30/19
05:46  3/30/19
07:58  
              



White Blood Count           12.1  H


Red Blood Count             3.70  L


Hemoglobin                  11.0  L


Hematocrit                  34.5  L


Mean Corpuscular             93.2


Volume


Mean Corpuscular             29.7


Hemoglobin


Mean Corpuscular           31.9  L
  
              
              



Hemoglobin
Concent


Red Cell                     12.2


Distribution Width


Platelet Count                254


Mean Platelet Volume        11.1  H


Immature                   1.600  H


Granulocytes %


Neutrophils %                74.6


Lymphocytes %               13.1  L


Monocytes %                   9.8


Eosinophils %                 0.5


Basophils %                   0.4


Nucleated Red Blood           0.0


Cells %


Immature                   0.190  H


Granulocytes #


Neutrophils #                9.0  H


Lymphocytes #                 1.6


Monocytes #                  1.2  H


Eosinophils #                 0.1


Basophils #                   0.1


Nucleated Red Blood           0.0


Cells #


Sodium Level                  137


Potassium Level               4.3


Chloride Level                104


Carbon Dioxide Level           27


Anion Gap                       6


Blood Urea Nitrogen            15


Creatinine                   0.82


Est Glomerular        > 60  
        
              
              



Filtrat Rate
mL/min


Glucose Level                 164


Calcium Level                 8.4


Phosphorus Level              3.7


Magnesium Level               2.2


Bedside Glucose                              161








Medications


Medication





Current Medications


IV Flush (NS 3 ml) 3 ml PER PROTOCOL IV ;  Start 3/29/19 at 02:30


Ondansetron HCl (Zofran Inj) 4 mg Q6H  PRN IV NAUSEA/VOMITING;  Start 3/29/19 at


02:30


Acetaminophen (Tylenol Tab) 650 mg Q6H  PRN PO .PAIN 1-3 OR TEMP Last 


administered on 3/30/19at 00:19; Admin Dose 650 MG;  Start 3/29/19 at 02:30


Aspirin (Aspirin) 81 mg DAILY PO  Last administered on 3/30/19at 08:02; Admin 


Dose 81 MG;  Start 3/29/19 at 09:00


Atorvastatin Calcium (Lipitor) 40 mg HS PO  Last administered on 3/29/19at 


20:16; Admin Dose 40 MG;  Start 3/29/19 at 21:00


Carvedilol (Coreg) 6.25 mg BID PO  Last administered on 3/30/19at 08:03; Admin 


Dose 6.25 MG;  Start 3/29/19 at 09:00


Clopidogrel Bisulfate (plaVIX) 75 mg DAILY PO  Last administered on 3/30/19at 


08:02; Admin Dose 75 MG;  Start 3/29/19 at 09:00


Lisinopril (Zestril) 5 mg DAILY PO  Last administered on 3/30/19at 08:03; Admin 


Dose 5 MG;  Start 3/29/19 at 09:00


Metformin HCl (Glucophage) 500 mg WITH BREAKFAST  DINNE PO  Last administered on


3/30/19at 08:02; Admin Dose 500 MG;  Start 3/29/19 at 07:35


Nitroglycerin (Nitroglycerin (Sl Tab) 0.4 Mg) 1 tab Q5M  PRN SL CHEST PAIN;  


Start 3/29/19 at 02:30


Diagnostic Test (Pha) (Accu-Chek) 1 ea 02 XX  Last administered on 3/30/19at 


01:52; Admin Dose 1 EA;  Start 3/30/19 at 02:00


Insulin Glargine (Lantus) 14 units DAILY@0800 SC  Last administered on 3/29/19at


08:01; Admin Dose 14 UNITS;  Start 3/29/19 at 08:00


Insulin Aspart (Novolog Insulin Pen) NOVOLOG *MILD* ALGORITHM WITH MEALS  


BEDTIME SC  Last administered on 3/30/19at 08:14; Admin Dose 1 UNIT;  Start 


3/29/19 at 07:35


Azithromycin 250 ml @  250 mls/hr DAILY IVPB  Last administered on 3/30/19at 


10:35; Admin Dose 250 MLS/HR;  Start 3/29/19 at 09:00


Miscellaneous Information 1 ea NOTE XX ;  Start 3/29/19 at 02:30


Glucose (Glutose) 15 gm Q15M  PRN PO DECREASED GLUCOSE;  Start 3/29/19 at 02:30


Glucose (Glutose) 22.5 gm Q15M  PRN PO DECREASED GLUCOSE;  Start 3/29/19 at 


02:30


Dextrose (D50w Syringe) 25 ml Q15M  PRN IV DECREASED GLUCOSE;  Start 3/29/19 at 


02:30


Dextrose (D50w Syringe) 50 ml Q15M  PRN IV DECREASED GLUCOSE;  Start 3/29/19 at 


02:30


Glucagon (Glucagen) 1 mg Q15M  PRN IM DECREASED GLUCOSE;  Start 3/29/19 at 02:30


Glucose (Glutose) 15 gm Q15M  PRN BUCCAL DECREASED GLUCOSE;  Start 3/29/19 at 


02:30


Ceftriaxone Sodium 50 ml @  100 mls/hr Q12H IVPB  Last administered on 3/30/19at


10:02; Admin Dose 100 MLS/HR;  Start 3/29/19 at 10:30


Albuterol/ Ipratropium (Duoneb) 3 ml Q4H RESP  THERAPY HHN  Last administered on


3/30/19at 05:14; Admin Dose 3 ML;  Start 3/29/19 at 09:00


Albuterol/ Ipratropium (Duoneb) 3 ml Q2H RESP THERAPY  PRN HHN SHORTNESS OF VAL


ATH;  Start 3/29/19 at 06:30


Heparin Sodium (Porcine) (Heparin  (5000 Units/1ml)) 5,000 unit BID SC  Last 


administered on 3/30/19at 08:14; Admin Dose 5,000 UNIT;  Start 3/29/19 at 13:30











MARGE STANLEY MD              Mar 30, 2019 11:06

## 2019-03-31 VITALS — HEART RATE: 81 BPM | DIASTOLIC BLOOD PRESSURE: 70 MMHG | RESPIRATION RATE: 18 BRPM | SYSTOLIC BLOOD PRESSURE: 138 MMHG

## 2019-03-31 VITALS — DIASTOLIC BLOOD PRESSURE: 60 MMHG | HEART RATE: 71 BPM | SYSTOLIC BLOOD PRESSURE: 117 MMHG | RESPIRATION RATE: 20 BRPM

## 2019-03-31 VITALS — RESPIRATION RATE: 18 BRPM | DIASTOLIC BLOOD PRESSURE: 79 MMHG | HEART RATE: 74 BPM | SYSTOLIC BLOOD PRESSURE: 149 MMHG

## 2019-03-31 VITALS — SYSTOLIC BLOOD PRESSURE: 133 MMHG | RESPIRATION RATE: 20 BRPM | HEART RATE: 78 BPM | DIASTOLIC BLOOD PRESSURE: 69 MMHG

## 2019-03-31 RX ADMIN — PHENOL PRN LOZENGE: 14.5 LOZENGE ORAL at 21:02

## 2019-03-31 RX ADMIN — LISINOPRIL SCH MG: 5 TABLET ORAL at 20:58

## 2019-03-31 RX ADMIN — IPRATROPIUM BROMIDE AND ALBUTEROL SULFATE 1 ML: .5; 3 SOLUTION RESPIRATORY (INHALATION) at 14:50

## 2019-03-31 RX ADMIN — GUAIFENESIN AND DEXTROMETHORPHAN 1 ML: 100; 10 SYRUP ORAL at 14:41

## 2019-03-31 RX ADMIN — INSULIN ASPART 1 UNIT: 100 INJECTION, SOLUTION INTRAVENOUS; SUBCUTANEOUS at 08:00

## 2019-03-31 RX ADMIN — CLOPIDOGREL SCH MG: 75 TABLET, FILM COATED ORAL at 08:51

## 2019-03-31 RX ADMIN — INSULIN ASPART 1 UNIT: 100 INJECTION, SOLUTION INTRAVENOUS; SUBCUTANEOUS at 12:57

## 2019-03-31 RX ADMIN — SODIUM FERRIC GLUCONATE COMPLEX 1 MLS/HR: 12.5 INJECTION INTRAVENOUS at 10:00

## 2019-03-31 RX ADMIN — CLOPIDOGREL 1 MG: 75 TABLET, FILM COATED ORAL at 08:51

## 2019-03-31 RX ADMIN — HEPARIN SODIUM SCH UNIT: 5000 INJECTION, SOLUTION INTRAVENOUS; SUBCUTANEOUS at 21:02

## 2019-03-31 RX ADMIN — IPRATROPIUM BROMIDE AND ALBUTEROL SULFATE 1 ML: .5; 3 SOLUTION RESPIRATORY (INHALATION) at 09:12

## 2019-03-31 RX ADMIN — BENZOCAINE, MENTHOL 1 LOZENGE: 15; 3.6 LOZENGE ORAL at 21:02

## 2019-03-31 RX ADMIN — SODIUM FERRIC GLUCONATE COMPLEX 1 MLS/HR: 12.5 INJECTION INTRAVENOUS at 12:09

## 2019-03-31 RX ADMIN — Medication 1 TAB: at 12:08

## 2019-03-31 RX ADMIN — LISINOPRIL 1 MG: 5 TABLET ORAL at 20:58

## 2019-03-31 RX ADMIN — INSULIN GLARGINE 1 UNITS: 100 INJECTION, SOLUTION SUBCUTANEOUS at 09:06

## 2019-03-31 RX ADMIN — LISINOPRIL SCH MG: 5 TABLET ORAL at 08:57

## 2019-03-31 RX ADMIN — ASPIRIN 81 MG CHEWABLE TABLET 1 MG: 81 TABLET CHEWABLE at 08:51

## 2019-03-31 RX ADMIN — CEFTRIAXONE SCH MLS/HR: 1 INJECTION, SOLUTION INTRAVENOUS at 22:14

## 2019-03-31 RX ADMIN — SODIUM FERRIC GLUCONATE COMPLEX SCH MLS/HR: 12.5 INJECTION INTRAVENOUS at 12:09

## 2019-03-31 RX ADMIN — PHENOL PRN LOZENGE: 14.5 LOZENGE ORAL at 03:57

## 2019-03-31 RX ADMIN — IPRATROPIUM BROMIDE AND ALBUTEROL SULFATE 1 ML: .5; 3 SOLUTION RESPIRATORY (INHALATION) at 01:14

## 2019-03-31 RX ADMIN — ASPIRIN 81 MG SCH MG: 81 TABLET ORAL at 08:51

## 2019-03-31 RX ADMIN — PHENOL PRN LOZENGE: 14.5 LOZENGE ORAL at 01:56

## 2019-03-31 RX ADMIN — CEFTRIAXONE 1 MLS/HR: 1 INJECTION, SOLUTION INTRAVENOUS at 22:14

## 2019-03-31 RX ADMIN — AZITHROMYCIN MONOHYDRATE 1 MLS/HR: 500 INJECTION, POWDER, LYOPHILIZED, FOR SOLUTION INTRAVENOUS at 08:57

## 2019-03-31 RX ADMIN — IPRATROPIUM BROMIDE AND ALBUTEROL SULFATE SCH ML: .5; 3 SOLUTION RESPIRATORY (INHALATION) at 17:00

## 2019-03-31 RX ADMIN — HEPARIN SODIUM 1 UNIT: 5000 INJECTION, SOLUTION INTRAVENOUS; SUBCUTANEOUS at 21:02

## 2019-03-31 RX ADMIN — CEFTRIAXONE SCH MLS/HR: 1 INJECTION, SOLUTION INTRAVENOUS at 10:23

## 2019-03-31 RX ADMIN — GUAIFENESIN AND DEXTROMETHORPHAN 1 ML: 100; 10 SYRUP ORAL at 01:56

## 2019-03-31 RX ADMIN — BENZOCAINE, MENTHOL 1 LOZENGE: 15; 3.6 LOZENGE ORAL at 01:56

## 2019-03-31 RX ADMIN — IPRATROPIUM BROMIDE AND ALBUTEROL SULFATE 1 ML: .5; 3 SOLUTION RESPIRATORY (INHALATION) at 17:00

## 2019-03-31 RX ADMIN — IPRATROPIUM BROMIDE AND ALBUTEROL SULFATE 1 ML: .5; 3 SOLUTION RESPIRATORY (INHALATION) at 05:00

## 2019-03-31 RX ADMIN — IPRATROPIUM BROMIDE AND ALBUTEROL SULFATE SCH ML: .5; 3 SOLUTION RESPIRATORY (INHALATION) at 01:14

## 2019-03-31 RX ADMIN — BENZOCAINE, MENTHOL 1 LOZENGE: 15; 3.6 LOZENGE ORAL at 03:57

## 2019-03-31 RX ADMIN — IPRATROPIUM BROMIDE AND ALBUTEROL SULFATE SCH ML: .5; 3 SOLUTION RESPIRATORY (INHALATION) at 21:27

## 2019-03-31 RX ADMIN — CEFTRIAXONE 1 MLS/HR: 1 INJECTION, SOLUTION INTRAVENOUS at 10:23

## 2019-03-31 RX ADMIN — IPRATROPIUM BROMIDE AND ALBUTEROL SULFATE SCH ML: .5; 3 SOLUTION RESPIRATORY (INHALATION) at 14:50

## 2019-03-31 RX ADMIN — ATORVASTATIN CALCIUM SCH MG: 40 TABLET, FILM COATED ORAL at 20:58

## 2019-03-31 RX ADMIN — INSULIN ASPART 1 UNIT: 100 INJECTION, SOLUTION INTRAVENOUS; SUBCUTANEOUS at 17:35

## 2019-03-31 RX ADMIN — AZITHROMYCIN MONOHYDRATE SCH MLS/HR: 500 INJECTION, POWDER, LYOPHILIZED, FOR SOLUTION INTRAVENOUS at 08:57

## 2019-03-31 RX ADMIN — ATORVASTATIN CALCIUM 1 MG: 40 TABLET, FILM COATED ORAL at 20:58

## 2019-03-31 RX ADMIN — Medication SCH TAB: at 12:08

## 2019-03-31 RX ADMIN — HEPARIN SODIUM SCH UNIT: 5000 INJECTION, SOLUTION INTRAVENOUS; SUBCUTANEOUS at 08:53

## 2019-03-31 RX ADMIN — IPRATROPIUM BROMIDE AND ALBUTEROL SULFATE 1 ML: .5; 3 SOLUTION RESPIRATORY (INHALATION) at 21:27

## 2019-03-31 RX ADMIN — HEPARIN SODIUM 1 UNIT: 5000 INJECTION, SOLUTION INTRAVENOUS; SUBCUTANEOUS at 08:53

## 2019-03-31 RX ADMIN — INSULIN GLARGINE SCH UNITS: 100 INJECTION, SOLUTION SUBCUTANEOUS at 09:06

## 2019-03-31 RX ADMIN — IPRATROPIUM BROMIDE AND ALBUTEROL SULFATE SCH ML: .5; 3 SOLUTION RESPIRATORY (INHALATION) at 09:12

## 2019-03-31 RX ADMIN — INSULIN ASPART 1 UNIT: 100 INJECTION, SOLUTION INTRAVENOUS; SUBCUTANEOUS at 21:00

## 2019-03-31 RX ADMIN — IPRATROPIUM BROMIDE AND ALBUTEROL SULFATE SCH ML: .5; 3 SOLUTION RESPIRATORY (INHALATION) at 05:00

## 2019-03-31 RX ADMIN — LISINOPRIL 1 MG: 5 TABLET ORAL at 08:57

## 2019-03-31 NOTE — PN
Date/Time of Note


Date/Time of Note


DATE: 3/31/19 


TIME: 09:34





Assessment/Plan


VTE Prophylaxis


Risk [place]from [facility] risk:  4


SCD applied (from [facility]  Yes


SCD contraindicated:  low risk/ambulating


Pharmacological prophylaxis:  heparin





Lines/Catheters


IV Catheter Type (from [facility]):  Saline Lock





Assessment/Plan


Problems:  


(1) Left lower lobe pneumonia


Status:  Acute


Comment:  Improving nicely.  Probable discharge in more


Qualifiers:  


   Pneumonia type:  due to unspecified organism  Qualified Codes:  J18.1 - Lobar


pneumonia, unspecified organism


(2) Diabetes mellitus type 2 in obese


Status:  Chronic


Comment:  Improving control with adjustments in medication.





(3) Coronary artery disease


Status:  Chronic


Comment:  Fortunately quiescent


Qualifiers:  


   Coronary Disease-Associated Artery/Lesion type:  native artery  Native vs. 


transplanted heart:  native heart  Associated angina:  without angina  Qualified


Codes:  I25.10 - Atherosclerotic heart disease of native coronary artery without


angina pectoris


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


Status:  Chronic


Comment:  Stable and well compensated.  Please note we have room to address the 


ACE inhibitor and he is already on carvedilol.  We will increase the ACE in


hibitor to 10 mg of lisinopril a day.  Please note discharge try and simplify 


the regimen to as many once a day medications as possible





(5) Grade I diastolic dysfunction


Status:  Chronic


Comment:  Control blood pressure





(6) Essential hypertension


Status:  Chronic


Comment:  Improving nicely





(7) Hyperlipidemia


Status:  Chronic


Comment:  On treatment.


Qualifiers:  


   Hyperlipidemia type:  pure hypercholesterolemia  Qualified Codes:  E78.00 - 


Pure hypercholesterolemia, unspecified


(8) Anemia


Status:  Acute


Comment:  We will give iron to help replace this but this needs an outpatient 


workup.


Qualifiers:  


   Anemia type:  iron deficiency  Iron deficiency anemia type:  unspecified iron


deficiency  Qualified Codes:  D50.9 - Iron deficiency anemia, unspecified


Result Diagram:  


3/30/19 0546                                                                    


           3/30/19 0546





Results 24hrs





Laboratory Tests


  Test
            3/30/19
12:09  3/30/19
17:37  3/30/19
20:39  3/31/19
02:15


  Bedside Glucose         244  H          149            186            154


  Test
            3/31/19
08:01  
              
              



  Bedside Glucose          113








Subjective


24 Hr Interval Summary


Free Text/Dictation


Patient reports he is feeling better.


Constitutional:  no complaints (Eyes fevers chills or sweats)


Respiratory:  no complaints (Shortness of breath is improved and cough is 


reduced)


Cardiovascular:  no complaints


Gastrointestinal:  no complaints


Genitourinary:  no complaints





Exam/Review of Systems


Exam


Vitals





Vital Signs


  Date      Temp  Pulse  Resp  B/P (MAP)   Pulse Ox  O2          O2 Flow    FiO2


Time                                                 Delivery    Rate


   3/31/19           72    17                    93  Nasal             2.0


     09:14                                           Cannula


   3/31/19  98.0                   133/69


     07:21                           (90)








Intake and Output





3/30/19


3/30/19


3/31/19





1515:00


23:00


07:00





IntakeIntake Total


1180 ml


480 ml


50 ml





BalanceBalance


1180 ml


480 ml


50 ml











Constitutional:  alert, oriented


Neck:  supple, non-tender


Respiratory:  normal air movement, crackles/rales (Basilar crackles)


Cardiovascular:  regular rate and rhythm, nl pulses


Gastrointestinal:  soft, nl liver, spleen, non-tender





Results


Results 24hrs





Laboratory Tests


  Test
            3/30/19
12:09  3/30/19
17:37  3/30/19
20:39  3/31/19
02:15


  Bedside Glucose         244  H          149            186            154


  Test
            3/31/19
08:01  
              
              



  Bedside Glucose          113








Medications


Medication





Current Medications


IV Flush (NS 3 ml) 3 ml PER PROTOCOL IV ;  Start 3/29/19 at 02:30


Ondansetron HCl (Zofran Inj) 4 mg Q6H  PRN IV NAUSEA/VOMITING;  Start 3/29/19 at


02:30


Acetaminophen (Tylenol Tab) 650 mg Q6H  PRN PO .PAIN 1-3 OR TEMP Last 


administered on 3/30/19at 00:19; Admin Dose 650 MG;  Start 3/29/19 at 02:30


Aspirin (Aspirin) 81 mg DAILY PO  Last administered on 3/31/19at 08:51; Admin 


Dose 81 MG;  Start 3/29/19 at 09:00


Atorvastatin Calcium (Lipitor) 40 mg HS PO  Last administered on 3/30/19at 


20:41; Admin Dose 40 MG;  Start 3/29/19 at 21:00


Carvedilol (Coreg) 6.25 mg BID PO  Last administered on 3/31/19at 08:52; Admin 


Dose 6.25 MG;  Start 3/29/19 at 09:00


Clopidogrel Bisulfate (plaVIX) 75 mg DAILY PO  Last administered on 3/31/19at 


08:51; Admin Dose 75 MG;  Start 3/29/19 at 09:00


Lisinopril (Zestril) 5 mg DAILY PO  Last administered on 3/31/19at 08:57; Admin 


Dose 5 MG;  Start 3/29/19 at 09:00


Nitroglycerin (Nitroglycerin (Sl Tab) 0.4 Mg) 1 tab Q5M  PRN SL CHEST PAIN;  


Start 3/29/19 at 02:30


Diagnostic Test (Pha) (Accu-Chek) 1 ea 02 XX  Last administered on 3/30/19at 


01:52; Admin Dose 1 EA;  Start 3/30/19 at 02:00


Insulin Glargine (Lantus) 14 units DAILY@0800 SC  Last administered on 3/31/19at


09:06; Admin Dose 14 UNITS;  Start 3/29/19 at 08:00


Insulin Aspart (Novolog Insulin Pen) NOVOLOG *MILD* ALGORITHM WITH MEALS  


BEDTIME SC  Last administered on 3/30/19at 20:45; Admin Dose 1 UNIT;  Start 


3/29/19 at 07:35


Azithromycin 250 ml @  250 mls/hr DAILY IVPB  Last administered on 3/31/19at 


08:57; Admin Dose 250 MLS/HR;  Start 3/29/19 at 09:00;  Stop 4/1/19 at 08:59


Miscellaneous Information 1 ea NOTE XX ;  Start 3/29/19 at 02:30


Glucose (Glutose) 15 gm Q15M  PRN PO DECREASED GLUCOSE;  Start 3/29/19 at 02:30


Glucose (Glutose) 22.5 gm Q15M  PRN PO DECREASED GLUCOSE;  Start 3/29/19 at 


02:30


Dextrose (D50w Syringe) 25 ml Q15M  PRN IV DECREASED GLUCOSE;  Start 3/29/19 at 


02:30


Dextrose (D50w Syringe) 50 ml Q15M  PRN IV DECREASED GLUCOSE;  Start 3/29/19 at 


02:30


Glucagon (Glucagen) 1 mg Q15M  PRN IM DECREASED GLUCOSE;  Start 3/29/19 at 02:30


Glucose (Glutose) 15 gm Q15M  PRN BUCCAL DECREASED GLUCOSE;  Start 3/29/19 at 


02:30


Ceftriaxone Sodium 50 ml @  100 mls/hr Q12H IVPB  Last administered on 3/30/19at


22:50; Admin Dose 100 MLS/HR;  Start 3/29/19 at 10:30;  Stop 4/5/19 at 10:29


Albuterol/ Ipratropium (Duoneb) 3 ml Q4H RESP  THERAPY HHN  Last administered on


3/31/19at 09:12; Admin Dose 3 ML;  Start 3/29/19 at 09:00


Albuterol/ Ipratropium (Duoneb) 3 ml Q2H RESP THERAPY  PRN HHN SHORTNESS OF 


BREATH;  Start 3/29/19 at 06:30


Heparin Sodium (Porcine) (Heparin  (5000 Units/1ml)) 5,000 unit BID SC  Last 


administered on 3/31/19at 08:53; Admin Dose 5,000 UNIT;  Start 3/29/19 at 13:30


Metformin HCl (Glucophage) 850 mg WITH BREAKFAST  DINNE PO  Last administered on


3/31/19at 08:50; Admin Dose 850 MG;  Start 3/30/19 at 17:55


Guaifenesin/ Dextromethorphan (Robitussin Dm Liquid Cup) 5 ml Q4H  PRN PO for 


coughing Last administered on 3/31/19at 01:56; Admin Dose 5 ML;  Start 3/31/19 


at 02:00


Phenol (Cepastat Lozenge) 1 lozenge Q1H  PRN MT for sore throat Last 


administered on 3/31/19at 03:57; Admin Dose 1 LOZENGE;  Start 3/31/19 at 02:00











MARGE STANLEY MD              Mar 31, 2019 09:39

## 2019-04-01 VITALS — DIASTOLIC BLOOD PRESSURE: 80 MMHG | SYSTOLIC BLOOD PRESSURE: 135 MMHG | HEART RATE: 71 BPM | RESPIRATION RATE: 18 BRPM

## 2019-04-01 VITALS — DIASTOLIC BLOOD PRESSURE: 74 MMHG | HEART RATE: 70 BPM | RESPIRATION RATE: 18 BRPM | SYSTOLIC BLOOD PRESSURE: 137 MMHG

## 2019-04-01 RX ADMIN — CLOPIDOGREL 1 MG: 75 TABLET, FILM COATED ORAL at 08:39

## 2019-04-01 RX ADMIN — IPRATROPIUM BROMIDE AND ALBUTEROL SULFATE 1 ML: .5; 3 SOLUTION RESPIRATORY (INHALATION) at 13:00

## 2019-04-01 RX ADMIN — IPRATROPIUM BROMIDE AND ALBUTEROL SULFATE SCH ML: .5; 3 SOLUTION RESPIRATORY (INHALATION) at 04:37

## 2019-04-01 RX ADMIN — ASPIRIN 81 MG SCH MG: 81 TABLET ORAL at 08:39

## 2019-04-01 RX ADMIN — BENZOCAINE, MENTHOL 1 LOZENGE: 15; 3.6 LOZENGE ORAL at 03:28

## 2019-04-01 RX ADMIN — IPRATROPIUM BROMIDE AND ALBUTEROL SULFATE 1 ML: .5; 3 SOLUTION RESPIRATORY (INHALATION) at 08:15

## 2019-04-01 RX ADMIN — CEFTRIAXONE 1 MLS/HR: 1 INJECTION, SOLUTION INTRAVENOUS at 10:06

## 2019-04-01 RX ADMIN — INSULIN GLARGINE 1 UNITS: 100 INJECTION, SOLUTION SUBCUTANEOUS at 08:41

## 2019-04-01 RX ADMIN — HEPARIN SODIUM SCH UNIT: 5000 INJECTION, SOLUTION INTRAVENOUS; SUBCUTANEOUS at 08:42

## 2019-04-01 RX ADMIN — INSULIN ASPART 1 UNIT: 100 INJECTION, SOLUTION INTRAVENOUS; SUBCUTANEOUS at 12:00

## 2019-04-01 RX ADMIN — PHENOL PRN LOZENGE: 14.5 LOZENGE ORAL at 03:28

## 2019-04-01 RX ADMIN — ASPIRIN 81 MG CHEWABLE TABLET 1 MG: 81 TABLET CHEWABLE at 08:39

## 2019-04-01 RX ADMIN — IPRATROPIUM BROMIDE AND ALBUTEROL SULFATE SCH ML: .5; 3 SOLUTION RESPIRATORY (INHALATION) at 08:15

## 2019-04-01 RX ADMIN — Medication 1 TAB: at 08:39

## 2019-04-01 RX ADMIN — IPRATROPIUM BROMIDE AND ALBUTEROL SULFATE 1 ML: .5; 3 SOLUTION RESPIRATORY (INHALATION) at 04:37

## 2019-04-01 RX ADMIN — CLOPIDOGREL SCH MG: 75 TABLET, FILM COATED ORAL at 08:39

## 2019-04-01 RX ADMIN — LISINOPRIL SCH MG: 5 TABLET ORAL at 08:40

## 2019-04-01 RX ADMIN — LISINOPRIL 1 MG: 5 TABLET ORAL at 08:40

## 2019-04-01 RX ADMIN — IPRATROPIUM BROMIDE AND ALBUTEROL SULFATE 1 ML: .5; 3 SOLUTION RESPIRATORY (INHALATION) at 00:41

## 2019-04-01 RX ADMIN — SODIUM FERRIC GLUCONATE COMPLEX SCH MLS/HR: 12.5 INJECTION INTRAVENOUS at 12:15

## 2019-04-01 RX ADMIN — CEFTRIAXONE SCH MLS/HR: 1 INJECTION, SOLUTION INTRAVENOUS at 10:06

## 2019-04-01 RX ADMIN — Medication SCH TAB: at 08:39

## 2019-04-01 RX ADMIN — IPRATROPIUM BROMIDE AND ALBUTEROL SULFATE SCH ML: .5; 3 SOLUTION RESPIRATORY (INHALATION) at 13:00

## 2019-04-01 RX ADMIN — HEPARIN SODIUM 1 UNIT: 5000 INJECTION, SOLUTION INTRAVENOUS; SUBCUTANEOUS at 08:42

## 2019-04-01 RX ADMIN — IPRATROPIUM BROMIDE AND ALBUTEROL SULFATE SCH ML: .5; 3 SOLUTION RESPIRATORY (INHALATION) at 00:41

## 2019-04-01 RX ADMIN — SODIUM FERRIC GLUCONATE COMPLEX 1 MLS/HR: 12.5 INJECTION INTRAVENOUS at 12:15

## 2019-04-01 RX ADMIN — INSULIN GLARGINE SCH UNITS: 100 INJECTION, SOLUTION SUBCUTANEOUS at 08:41

## 2019-04-01 RX ADMIN — INSULIN ASPART 1 UNIT: 100 INJECTION, SOLUTION INTRAVENOUS; SUBCUTANEOUS at 08:00

## 2019-04-01 NOTE — DS
Date/Time of Note


Date/Time of Note


DATE: 4/1/19 


TIME: 11:38





Discharge Summary


Admission/Discharge Info


Admit Date/Time


Mar 29, 2019 at 01:54


Discharge Date/Time





Discharge Diagnosis


1. Community acquired pneumonia, improving, on antibiotics


2. Sepsis, resolved


3. DM, stable


4. CAD with stents: no chest pain, on aspirin, antiplatelet, lipitor


5. Ischemic cardiomyopathy (EF 40% in 2017), compensated, on coreg and 


lisinopril


6. History of ascending aortic aneurysm: No acute issue


7. Hypertension, controlled


Patient Condition:  Stable


Hospital Course


This is a 68-year-old male with a history of CAD with stent, ascending aortic 


aneurysm, diabetes, hypertension, ischemic cardiomyopathy (EF 40% in 2017).  


Patient was brought to the ER for productive cough, fever and congestion.  


Symptoms been progressively getting worse for the past 2 days also.  Cough has 


been productive of greenish/yellow sputum.  Denied chest pain.  Reported some 


shortness of breath.


When presented to ER, he was febrile with temperature of 101.5.  Labs shows a 


WBC of almost 14,000.  Chest x-ray shows Left lower lobe confluent interstitial 


infiltrate.





Patient is treatyed with rocephin and zithromax for pneumonia, symptoms 


improved. I will discharge him with levaquin for 7 more days.


Home Meds


Active Scripts


Levofloxacin* (Levofloxacin*) 500 Mg Tablet, 500 MG PO DAILY for 7 Days, TAB


   Prov:ELANA MORROW MD         4/1/19


Glipizide* (Glipizide*) 5 Mg Tablet, 5 MG PO AC BREAKFAST, #60 TAB


   Prov:BRUCE LIRIANO         7/18/17


Metformin Hcl* (Metformin Hcl*) 500 Mg Tablet, 500 MG PO WITH BREAKFAST DINNE, 


#60 TAB 1 Refill


   Prov:BRUCE LIRIANO         7/18/17


Nitroglycerin* (Nitroglycerin* SL) 0.4 Mg Tab.subl, 0.4 MG SL Q5MIN PRN for 


CHEST PAIN, #90 BOTTLE


   Prov:BRUCE LIRIANO         7/18/17


Aspirin (Aspirin) 81 Mg Chew, 81 MG PO DAILY, #90 TAB


   Prov:BRUCE LIRIANO         7/18/17


Lisinopril* (Lisinopril*) 5 Mg Tablet, 5 MG PO DAILY, #60 TAB


   Prov:BRUCE LIRIANO         7/18/17


Carvedilol* (Carvedilol*) 6.25 Mg Tablet, 6.25 MG PO BID, #60 TAB 1 Refill


   Prov:DC LIRIANOALMAS         7/18/17


Atorvastatin* (Atorvastatin*) 40 Mg Tablet, 40 MG PO HS, #60 TAB


   Prov:DEANDRABRUCE RICE         7/18/17


Clopidogrel Bisulfate (Clopidogrel) 75 Mg Tablet, 75 MG PO DAILY, #90 TAB


   Prov:DEANDRABRUCE         7/18/17


Primary Care Provider


[facility]


Pending Labs





Laboratory Tests


Test
              3/31/19
12:55   3/31/19
17:34   3/31/19
20:56    4/1/19
08:32


Bedside                      156             110             106              92


Glucose
          mg/dL
()  mg/dL
()














ELANA MORROW MD                 Apr 1, 2019 11:40